# Patient Record
Sex: MALE | Race: WHITE | NOT HISPANIC OR LATINO | Employment: OTHER | URBAN - METROPOLITAN AREA
[De-identification: names, ages, dates, MRNs, and addresses within clinical notes are randomized per-mention and may not be internally consistent; named-entity substitution may affect disease eponyms.]

---

## 2017-09-14 DIAGNOSIS — G40.209 PARTIAL SYMPTOMATIC EPILEPSY WITH COMPLEX PARTIAL SEIZURES, NOT INTRACTABLE, WITHOUT STATUS EPILEPTICUS (HCC): ICD-10-CM

## 2017-09-14 RX ORDER — LEVETIRACETAM 500 MG/1
TABLET ORAL
Qty: 60 TABLET | Refills: 0 | Status: SHIPPED | OUTPATIENT
Start: 2017-09-14 | End: 2017-09-27 | Stop reason: SDUPTHER

## 2017-09-27 ENCOUNTER — OFFICE VISIT (OUTPATIENT)
Dept: NEUROLOGY | Facility: CLINIC | Age: 22
End: 2017-09-27

## 2017-09-27 ENCOUNTER — APPOINTMENT (OUTPATIENT)
Dept: LAB | Facility: HOSPITAL | Age: 22
End: 2017-09-27

## 2017-09-27 VITALS
DIASTOLIC BLOOD PRESSURE: 73 MMHG | HEIGHT: 65 IN | RESPIRATION RATE: 16 BRPM | SYSTOLIC BLOOD PRESSURE: 114 MMHG | HEART RATE: 65 BPM | BODY MASS INDEX: 24.32 KG/M2 | WEIGHT: 146 LBS

## 2017-09-27 DIAGNOSIS — G40.209 PARTIAL SYMPTOMATIC EPILEPSY WITH COMPLEX PARTIAL SEIZURES, NOT INTRACTABLE, WITHOUT STATUS EPILEPTICUS (HCC): Primary | ICD-10-CM

## 2017-09-27 LAB
ALBUMIN SERPL-MCNC: 5.1 G/DL (ref 3.2–4.8)
ALBUMIN/GLOB SERPL: 1.8 G/DL (ref 1.5–2.5)
ALP SERPL-CCNC: 133 U/L (ref 25–100)
ALT SERPL W P-5'-P-CCNC: 31 U/L (ref 7–40)
ANION GAP SERPL CALCULATED.3IONS-SCNC: 7 MMOL/L (ref 3–11)
AST SERPL-CCNC: 26 U/L (ref 0–33)
BASOPHILS # BLD AUTO: 0.06 10*3/MM3 (ref 0–0.2)
BASOPHILS NFR BLD AUTO: 1.1 % (ref 0–1)
BILIRUB SERPL-MCNC: 0.6 MG/DL (ref 0.3–1.2)
BUN BLD-MCNC: 15 MG/DL (ref 9–23)
BUN/CREAT SERPL: 16.7 (ref 7–25)
CALCIUM SPEC-SCNC: 10.1 MG/DL (ref 8.7–10.4)
CHLORIDE SERPL-SCNC: 103 MMOL/L (ref 99–109)
CO2 SERPL-SCNC: 31 MMOL/L (ref 20–31)
CREAT BLD-MCNC: 0.9 MG/DL (ref 0.6–1.3)
DEPRECATED RDW RBC AUTO: 40.3 FL (ref 37–54)
EOSINOPHIL # BLD AUTO: 0.26 10*3/MM3 (ref 0–0.3)
EOSINOPHIL NFR BLD AUTO: 4.7 % (ref 0–3)
ERYTHROCYTE [DISTWIDTH] IN BLOOD BY AUTOMATED COUNT: 11.9 % (ref 11.3–14.5)
GFR SERPL CREATININE-BSD FRML MDRD: 107 ML/MIN/1.73
GLOBULIN UR ELPH-MCNC: 2.8 GM/DL
GLUCOSE BLD-MCNC: 83 MG/DL (ref 70–100)
HCT VFR BLD AUTO: 47.2 % (ref 38.9–50.9)
HGB BLD-MCNC: 16.2 G/DL (ref 13.1–17.5)
IMM GRANULOCYTES # BLD: 0.01 10*3/MM3 (ref 0–0.03)
IMM GRANULOCYTES NFR BLD: 0.2 % (ref 0–0.6)
LYMPHOCYTES # BLD AUTO: 1.78 10*3/MM3 (ref 0.6–4.8)
LYMPHOCYTES NFR BLD AUTO: 32.5 % (ref 24–44)
MCH RBC QN AUTO: 31.4 PG (ref 27–31)
MCHC RBC AUTO-ENTMCNC: 34.3 G/DL (ref 32–36)
MCV RBC AUTO: 91.5 FL (ref 80–99)
MONOCYTES # BLD AUTO: 0.51 10*3/MM3 (ref 0–1)
MONOCYTES NFR BLD AUTO: 9.3 % (ref 0–12)
NEUTROPHILS # BLD AUTO: 2.86 10*3/MM3 (ref 1.5–8.3)
NEUTROPHILS NFR BLD AUTO: 52.2 % (ref 41–71)
PLATELET # BLD AUTO: 278 10*3/MM3 (ref 150–450)
PMV BLD AUTO: 10.8 FL (ref 6–12)
POTASSIUM BLD-SCNC: 4.1 MMOL/L (ref 3.5–5.5)
PROT SERPL-MCNC: 7.9 G/DL (ref 5.7–8.2)
RBC # BLD AUTO: 5.16 10*6/MM3 (ref 4.2–5.76)
SODIUM BLD-SCNC: 141 MMOL/L (ref 132–146)
WBC NRBC COR # BLD: 5.48 10*3/MM3 (ref 4.5–13.5)

## 2017-09-27 PROCEDURE — 36415 COLL VENOUS BLD VENIPUNCTURE: CPT | Performed by: NURSE PRACTITIONER

## 2017-09-27 PROCEDURE — 80053 COMPREHEN METABOLIC PANEL: CPT | Performed by: NURSE PRACTITIONER

## 2017-09-27 PROCEDURE — 99212 OFFICE O/P EST SF 10 MIN: CPT | Performed by: NURSE PRACTITIONER

## 2017-09-27 PROCEDURE — 85025 COMPLETE CBC W/AUTO DIFF WBC: CPT | Performed by: NURSE PRACTITIONER

## 2017-09-27 PROCEDURE — 80177 DRUG SCRN QUAN LEVETIRACETAM: CPT | Performed by: NURSE PRACTITIONER

## 2017-09-27 RX ORDER — LEVETIRACETAM 500 MG/1
500 TABLET ORAL 2 TIMES DAILY
Qty: 180 TABLET | Refills: 3 | Status: SHIPPED | OUTPATIENT
Start: 2017-09-27 | End: 2018-09-26 | Stop reason: SDUPTHER

## 2017-09-27 NOTE — PROGRESS NOTES
Subjective:     Patient ID: Luiz Sofia is a 22 y.o. male.    HPI:   History of Present Illness     Here for 1 year follow up on complex partial seizure history with onset at age 4 years of age. History of premature birth with extended NICU stay and Cerebral Palsy with continued LLE weakness. He is accompanied by his mother today. He is currently on Keppra 500mg BID and has tolerated this very well without any side effects. He is needing refills. He has been seizure free for about 9 years. They have decided to keep him on AED due to risk of having seizures would be higher than benefit of weaning off. His seizures in the past have been precipitated with aura, followed by left eye gaze, Ramos's paralysis, urinary incontinence, left sided jerking and last 3-5 minutes per his mother. He is doing well and is able to play video games and use the computer without difficulty. He has had multiple MRIs and CTs of head in past and EEGs showing abnormal spikes consistent with focal seizures. Due for annual labs today. No new concerns or complaints.    The following portions of the patient's history were reviewed and updated as appropriate: allergies, current medications, past family history, past medical history, past social history, past surgical history and problem list.    Past Medical History:   Diagnosis Date   • Cerebral palsy, monoplegia 1996   • Neurological symptoms    • Seizures        Past Surgical History:   Procedure Laterality Date   • EYE SURGERY      Retina Reattached        Social History     Social History   • Marital status: Single     Spouse name: N/A   • Number of children: N/A   • Years of education: N/A     Occupational History   • Not on file.     Social History Main Topics   • Smoking status: Never Smoker   • Smokeless tobacco: Not on file   • Alcohol use No   • Drug use: Not on file   • Sexual activity: Not on file     Other Topics Concern   • Not on file     Social History Narrative       Family  History   Problem Relation Age of Onset   • Parkinsonism Other      Grandmother   • Diabetes Father    • Hypertension Father         Review of Systems   Constitutional: Negative for chills, fatigue, fever and unexpected weight change.   HENT: Negative for ear pain, hearing loss, nosebleeds, rhinorrhea and sore throat.    Eyes: Negative for photophobia, pain, discharge, itching and visual disturbance.   Respiratory: Negative for cough, chest tightness, shortness of breath and wheezing.    Cardiovascular: Negative for chest pain, palpitations and leg swelling.   Gastrointestinal: Negative for abdominal pain, blood in stool, constipation, diarrhea, nausea and vomiting.   Genitourinary: Negative for dysuria, frequency, hematuria and urgency.   Musculoskeletal: Negative for arthralgias, back pain, gait problem, joint swelling, myalgias, neck pain and neck stiffness.   Skin: Negative for rash and wound.   Allergic/Immunologic: Negative for environmental allergies and food allergies.   Neurological: Negative for dizziness, tremors, seizures, syncope, speech difficulty, weakness, light-headedness, numbness and headaches.   Hematological: Negative for adenopathy. Does not bruise/bleed easily.   Psychiatric/Behavioral: Negative for agitation, confusion, decreased concentration, hallucinations, sleep disturbance and suicidal ideas. The patient is not nervous/anxious.         Objective:    Neurologic Exam  Mental Status   Oriented to person, place, and time.   Attention: normal. Concentration: normal.   Speech: speech is normal   Level of consciousness: alert     Cranial Nerves   Cranial nerves II through XII intact.      Motor Exam   Muscle bulk: normal  Right arm tone: normal  Left arm tone: normal  Right leg tone: normal  Left leg tone: spastic     Strength   Strength 5/5 except as noted.   Left quadriceps: 4/5  Left hamstrin/5  Left anterior tibial: 4/5  Left posterior tibial: 4/5  Left peroneal: 4/5  Left gastroc:  4/5     Sensory Exam   Light touch normal.   Vibration normal.   Proprioception normal.   Pinprick normal.      Gait, Coordination, and Reflexes      Gait  Gait: (mild limp with left lower extremity CP)     Coordination   Romberg: negative  Finger to nose coordination: normal  Heel to shin coordination: normal     Tremor   Resting tremor: absent  Intention tremor: absent  Action tremor: absent     Reflexes   Right brachioradialis: 2+  Left brachioradialis: 2+  Right biceps: 2+  Left biceps: 2+  Right triceps: 2+  Left triceps: 2+  Right patellar: 2+  Left patellar: 3+  Right achilles: 2+  Left achilles: 3+  Right : 2+  Left : 2+  Right plantar: normal  Left plantar: normal  Right Kim: absent  Left Kim: absent  Right ankle clonus: absent  Left ankle clonus: absent     Physical Exam  Constitutional: He is oriented to person, place, and time.   Neurological: He is oriented to person, place, and time. He has a normal Finger-Nose-Finger Test, a normal Heel to Camp Test and a normal Romberg Test.   Reflex Scores:       Tricep reflexes are 2+ on the right side and 2+ on the left side.       Bicep reflexes are 2+ on the right side and 2+ on the left side.       Brachioradialis reflexes are 2+ on the right side and 2+ on the left side.       Patellar reflexes are 2+ on the right side and 3+ on the left side.       Achilles reflexes are 2+ on the right side and 3+ on the left side.  Psychiatric: His speech is normal.     Assessment/Plan:       Luiz was seen today for seizures.    Diagnoses and all orders for this visit:    Partial symptomatic epilepsy with complex partial seizures, not intractable, without status epilepticus  -     levETIRAcetam (KEPPRA) 500 MG tablet; Take 1 tablet by mouth 2 (Two) Times a Day.  -     CBC & Differential  -     Comprehensive Metabolic Panel  -     Levetiracetam Level (Keppra)  -     CBC Auto Differential         Continue current medications. Labs due today. F/U in 1 year or  sooner if needed. Reviewed medications, potential side effects and signs and symptoms to report. Discussed risk versus benefits of treatment plan with patient and/or family-including medications, labs and radiology that may be ordered. Addressed questions and concerns during visit. Patient and/or family verbalized understanding and agree with plan.    During this visit the following were done:  Labs Reviewed []    Labs Ordered [x]    Radiology Reports Reviewed []    Radiology Ordered []    PCP Records Reviewed []    Referring Provider Records Reviewed []    ER Records Reviewed []    Hospital Records Reviewed []    History Obtained From Family []    Radiology Images Reviewed []    Other Reviewed []    Records Requested []      Edna García, APRN  9/27/2017

## 2017-09-28 ENCOUNTER — TELEPHONE (OUTPATIENT)
Dept: NEUROLOGY | Facility: CLINIC | Age: 22
End: 2017-09-28

## 2017-09-29 ENCOUNTER — TELEPHONE (OUTPATIENT)
Dept: NEUROLOGY | Facility: CLINIC | Age: 22
End: 2017-09-29

## 2017-09-29 NOTE — TELEPHONE ENCOUNTER
I spoke with the pt's mother and let her know that the pt's lab results were normal and a copy had been faxed to his PCP and she acknowledged understanding

## 2017-09-30 LAB — LEVETIRACETAM SERPL-MCNC: 20.9 UG/ML (ref 10–40)

## 2018-09-26 ENCOUNTER — APPOINTMENT (OUTPATIENT)
Dept: LAB | Facility: HOSPITAL | Age: 23
End: 2018-09-26

## 2018-09-26 ENCOUNTER — OFFICE VISIT (OUTPATIENT)
Dept: NEUROLOGY | Facility: CLINIC | Age: 23
End: 2018-09-26

## 2018-09-26 VITALS
WEIGHT: 150 LBS | BODY MASS INDEX: 24.96 KG/M2 | DIASTOLIC BLOOD PRESSURE: 60 MMHG | OXYGEN SATURATION: 99 % | HEART RATE: 54 BPM | SYSTOLIC BLOOD PRESSURE: 122 MMHG

## 2018-09-26 DIAGNOSIS — G40.209 PARTIAL SYMPTOMATIC EPILEPSY WITH COMPLEX PARTIAL SEIZURES, NOT INTRACTABLE, WITHOUT STATUS EPILEPTICUS (HCC): Primary | ICD-10-CM

## 2018-09-26 DIAGNOSIS — G80.8 CEREBRAL PALSY, MONOPLEGIA (HCC): ICD-10-CM

## 2018-09-26 LAB
ALBUMIN SERPL-MCNC: 4.75 G/DL (ref 3.2–4.8)
ALBUMIN/GLOB SERPL: 1.7 G/DL (ref 1.5–2.5)
ALP SERPL-CCNC: 131 U/L (ref 25–100)
ALT SERPL W P-5'-P-CCNC: 37 U/L (ref 7–40)
ANION GAP SERPL CALCULATED.3IONS-SCNC: 7 MMOL/L (ref 3–11)
AST SERPL-CCNC: 25 U/L (ref 0–33)
BASOPHILS # BLD AUTO: 0.06 10*3/MM3 (ref 0–0.2)
BASOPHILS NFR BLD AUTO: 0.9 % (ref 0–1)
BILIRUB SERPL-MCNC: 0.6 MG/DL (ref 0.3–1.2)
BUN BLD-MCNC: 16 MG/DL (ref 9–23)
BUN/CREAT SERPL: 17.4 (ref 7–25)
CALCIUM SPEC-SCNC: 9.5 MG/DL (ref 8.7–10.4)
CHLORIDE SERPL-SCNC: 102 MMOL/L (ref 99–109)
CO2 SERPL-SCNC: 31 MMOL/L (ref 20–31)
CREAT BLD-MCNC: 0.92 MG/DL (ref 0.6–1.3)
DEPRECATED RDW RBC AUTO: 40.7 FL (ref 37–54)
EOSINOPHIL # BLD AUTO: 0.44 10*3/MM3 (ref 0–0.3)
EOSINOPHIL NFR BLD AUTO: 6.3 % (ref 0–3)
ERYTHROCYTE [DISTWIDTH] IN BLOOD BY AUTOMATED COUNT: 12.1 % (ref 11.3–14.5)
GFR SERPL CREATININE-BSD FRML MDRD: 103 ML/MIN/1.73
GLOBULIN UR ELPH-MCNC: 2.9 GM/DL
GLUCOSE BLD-MCNC: 108 MG/DL (ref 70–100)
HCT VFR BLD AUTO: 48.4 % (ref 38.9–50.9)
HGB BLD-MCNC: 16.4 G/DL (ref 13.1–17.5)
IMM GRANULOCYTES # BLD: 0.02 10*3/MM3 (ref 0–0.03)
IMM GRANULOCYTES NFR BLD: 0.3 % (ref 0–0.6)
LYMPHOCYTES # BLD AUTO: 2 10*3/MM3 (ref 0.6–4.8)
LYMPHOCYTES NFR BLD AUTO: 28.8 % (ref 24–44)
MCH RBC QN AUTO: 31 PG (ref 27–31)
MCHC RBC AUTO-ENTMCNC: 33.9 G/DL (ref 32–36)
MCV RBC AUTO: 91.5 FL (ref 80–99)
MONOCYTES # BLD AUTO: 0.55 10*3/MM3 (ref 0–1)
MONOCYTES NFR BLD AUTO: 7.9 % (ref 0–12)
NEUTROPHILS # BLD AUTO: 3.88 10*3/MM3 (ref 1.5–8.3)
NEUTROPHILS NFR BLD AUTO: 55.8 % (ref 41–71)
PLATELET # BLD AUTO: 251 10*3/MM3 (ref 150–450)
PMV BLD AUTO: 10.7 FL (ref 6–12)
POTASSIUM BLD-SCNC: 3.9 MMOL/L (ref 3.5–5.5)
PROT SERPL-MCNC: 7.6 G/DL (ref 5.7–8.2)
RBC # BLD AUTO: 5.29 10*6/MM3 (ref 4.2–5.76)
SODIUM BLD-SCNC: 140 MMOL/L (ref 132–146)
WBC NRBC COR # BLD: 6.95 10*3/MM3 (ref 3.5–10.8)

## 2018-09-26 PROCEDURE — 99213 OFFICE O/P EST LOW 20 MIN: CPT | Performed by: NURSE PRACTITIONER

## 2018-09-26 PROCEDURE — 80053 COMPREHEN METABOLIC PANEL: CPT | Performed by: NURSE PRACTITIONER

## 2018-09-26 PROCEDURE — 85025 COMPLETE CBC W/AUTO DIFF WBC: CPT | Performed by: NURSE PRACTITIONER

## 2018-09-26 PROCEDURE — 36415 COLL VENOUS BLD VENIPUNCTURE: CPT | Performed by: NURSE PRACTITIONER

## 2018-09-26 PROCEDURE — 80177 DRUG SCRN QUAN LEVETIRACETAM: CPT | Performed by: NURSE PRACTITIONER

## 2018-09-26 RX ORDER — LEVETIRACETAM 500 MG/1
500 TABLET ORAL 2 TIMES DAILY
Qty: 180 TABLET | Refills: 3 | Status: SHIPPED | OUTPATIENT
Start: 2018-09-26 | End: 2019-09-18 | Stop reason: SDUPTHER

## 2018-09-26 NOTE — PROGRESS NOTES
Subjective:     Patient ID: Luiz Sofia is a 23 y.o. male.    CC:   Chief Complaint   Patient presents with   • Seizures       HPI:   History of Present Illness   This is a pleasant 24 yo male who presents for 1 year follow up on complex partial seizure history with onset at age 4 years of age. History of premature birth with extended NICU stay and Cerebral Palsy with continued LUE/LLE weakness. He is accompanied by his mother today. He is currently on Keppra 500mg BID and has tolerated this very well without any side effects. He is needing refills today. He has been seizure free for about 10 years. They wish to continue current medications. His seizures in the past have been precipitated with aura, followed by left eye gaze, Ramos's paralysis, urinary incontinence, left sided jerking and last 3-5 minutes per his mother. He is doing well and is able to play video games and use the computer without difficulty. He is rowing 5-6 days a week at home with a rowing machine and is playing special olympics basketball and softball and this has improved his core over the past year. He has had multiple MRIs and CTs of head in past and EEGs showing abnormal spikes consistent with focal seizures. No new concerns or complaints.     The following portions of the patient's history were reviewed and updated as appropriate: allergies, current medications, past family history, past medical history, past social history, past surgical history and problem list.    Past Medical History:   Diagnosis Date   • Cerebral palsy, monoplegia (CMS/HCC) 1996   • Neurological symptoms    • Seizures (CMS/HCC)        Past Surgical History:   Procedure Laterality Date   • EYE SURGERY      Retina Reattached        Social History     Social History   • Marital status: Single     Spouse name: N/A   • Number of children: N/A   • Years of education: N/A     Occupational History   • Not on file.     Social History Main Topics   • Smoking status: Never Smoker    • Smokeless tobacco: Not on file   • Alcohol use No   • Drug use: Unknown   • Sexual activity: Defer     Other Topics Concern   • Not on file     Social History Narrative   • No narrative on file       Family History   Problem Relation Age of Onset   • Parkinsonism Other         Grandmother   • Diabetes Father    • Hypertension Father         Review of Systems   Constitutional: Negative for chills, fatigue, fever and unexpected weight change.   HENT: Negative for ear pain, hearing loss, nosebleeds, rhinorrhea and sore throat.    Eyes: Negative for photophobia, pain, discharge, itching and visual disturbance.   Respiratory: Negative for cough, chest tightness, shortness of breath and wheezing.    Cardiovascular: Negative for chest pain, palpitations and leg swelling.   Gastrointestinal: Negative for abdominal pain, blood in stool, constipation, diarrhea, nausea and vomiting.   Genitourinary: Negative for dysuria, frequency, hematuria and urgency.   Musculoskeletal: Negative for arthralgias, back pain, gait problem, joint swelling, myalgias, neck pain and neck stiffness.   Skin: Negative for rash and wound.   Allergic/Immunologic: Negative for environmental allergies and food allergies.   Neurological: Negative for dizziness, tremors, seizures, syncope, speech difficulty, weakness, light-headedness, numbness and headaches.   Hematological: Negative for adenopathy. Does not bruise/bleed easily.   Psychiatric/Behavioral: Negative for agitation, confusion, decreased concentration, hallucinations, sleep disturbance and suicidal ideas. The patient is not nervous/anxious.    All other systems reviewed and are negative.       Objective:    Neurologic Exam     Mental Status   Oriented to person, place, and time.   Level of consciousness: alert    Cranial Nerves   Cranial nerves II through XII intact.     Motor Exam   Muscle bulk: normal  Right arm tone: normal  Left arm tone: increased  Right leg tone: normal  Left leg  tone: spastic    Strength   Strength 5/5 except as noted.   Left strength: LUE gross motor intact fully, LLE 4/5    Gait, Coordination, and Reflexes     Gait  Gait: (mild limp with left lower extremity CP)    Reflexes   Right brachioradialis: 2+  Left brachioradialis: 2+  Right biceps: 2+  Left biceps: 2+  Right triceps: 2+  Left triceps: 2+  Right patellar: 2+  Left patellar: 3+  Right achilles: 2+  Left achilles: 2+  Right : 2+  Left : 2+      Physical Exam   Constitutional: He is oriented to person, place, and time.   Neurological: He is oriented to person, place, and time.   Reflex Scores:       Tricep reflexes are 2+ on the right side and 2+ on the left side.       Bicep reflexes are 2+ on the right side and 2+ on the left side.       Brachioradialis reflexes are 2+ on the right side and 2+ on the left side.       Patellar reflexes are 2+ on the right side and 3+ on the left side.       Achilles reflexes are 2+ on the right side and 2+ on the left side.      Assessment/Plan:       Luiz was seen today for seizures.    Diagnoses and all orders for this visit:    Partial symptomatic epilepsy with complex partial seizures, not intractable, without status epilepticus (CMS/HCC)  -     levETIRAcetam (KEPPRA) 500 MG tablet; Take 1 tablet by mouth 2 (Two) Times a Day.  -     Comprehensive Metabolic Panel  -     CBC & Differential  -     Levetiracetam Level (Keppra)    Cerebral palsy, monoplegia (CMS/HCC)  Comments:  Continue rowing 5-6 days a week; continue Basketball and Softball with [employer].         F/U in 1 year or sooner if needed. Reviewed medications, potential side effects and signs and symptoms to report. Discussed risk versus benefits of treatment plan with patient and/or family-including medications, labs and radiology that may be ordered. Addressed questions and concerns during visit. Patient and/or family verbalized understanding and agree with plan.    During this visit the following were  done:  Labs Reviewed [x]   9/27/17 Keppra, CBC with diff and CMP all were stable with no significant abnormalities.  Labs Ordered [x]    Radiology Reports Reviewed []    Radiology Ordered []    PCP Records Reviewed []    Referring Provider Records Reviewed []    ER Records Reviewed []    Hospital Records Reviewed []    History Obtained From Family []    Radiology Images Reviewed []    Other Reviewed []    Records Requested []      Patient instructions include: No driving or operating heavy machinery for 3 months from onset of most recent seizure. Minimize stress as much as possible. Recommended 7-8 hours of sleep each night. Abstain from alcohol intake. Educated on Antiepileptic medications with possible side effects and signs and symptoms to report if prescribed during visit. Instructed to take seizure medication daily if prescribed. Reviewed potential seizure risk factors. Instructed to call 911 or our office if another seizure does occur.    EMR Dragon/Transcription Disclaimer:  Much of this encounter note is an electronic transcription of spoken language to printed text. Electronic transcription of spoken language may permit erroneous words or phrases to be inadvertently transcribed. Although I have reviewed the note for such errors, some may still exist in this documentation.      Edna García, APRN  9/26/2018

## 2018-09-27 ENCOUNTER — TELEPHONE (OUTPATIENT)
Dept: NEUROLOGY | Facility: CLINIC | Age: 23
End: 2018-09-27

## 2018-09-28 ENCOUNTER — TELEPHONE (OUTPATIENT)
Dept: NEUROLOGY | Facility: CLINIC | Age: 23
End: 2018-09-28

## 2018-09-28 NOTE — TELEPHONE ENCOUNTER
----- Message from KAT Lama sent at 9/27/2018  8:22 AM EDT -----  Labs look good overall. Please mail copy to patient/mom and fax copy to PCP. Thanks, KAT Hairston

## 2018-09-28 NOTE — TELEPHONE ENCOUNTER
Spoke with pt's mother and gave normal lab results and faxed results to PCP and mailed pt a copy as well.

## 2018-09-29 LAB — LEVETIRACETAM SERPL-MCNC: 24.3 UG/ML (ref 10–40)

## 2018-10-01 ENCOUNTER — TELEPHONE (OUTPATIENT)
Dept: NEUROLOGY | Facility: CLINIC | Age: 23
End: 2018-10-01

## 2018-10-01 NOTE — TELEPHONE ENCOUNTER
----- Message from KAT Lama sent at 10/1/2018  8:25 AM EDT -----  Labs WNL. Fax to PCP. Mail copy to patient. Continue current medication/dosage. F/U as scheduled. Thanks, KAT Hairston

## 2019-04-23 DIAGNOSIS — G40.209 PARTIAL SYMPTOMATIC EPILEPSY WITH COMPLEX PARTIAL SEIZURES, NOT INTRACTABLE, WITHOUT STATUS EPILEPTICUS (HCC): ICD-10-CM

## 2019-04-24 RX ORDER — LEVETIRACETAM 500 MG/1
TABLET ORAL
Qty: 180 TABLET | Refills: 2 | Status: SHIPPED | OUTPATIENT
Start: 2019-04-24 | End: 2019-09-18

## 2019-09-18 ENCOUNTER — OFFICE VISIT (OUTPATIENT)
Dept: NEUROLOGY | Facility: CLINIC | Age: 24
End: 2019-09-18

## 2019-09-18 VITALS
OXYGEN SATURATION: 99 % | WEIGHT: 157 LBS | HEIGHT: 65 IN | SYSTOLIC BLOOD PRESSURE: 124 MMHG | BODY MASS INDEX: 26.16 KG/M2 | DIASTOLIC BLOOD PRESSURE: 80 MMHG | HEART RATE: 75 BPM

## 2019-09-18 DIAGNOSIS — G40.209 PARTIAL SYMPTOMATIC EPILEPSY WITH COMPLEX PARTIAL SEIZURES, NOT INTRACTABLE, WITHOUT STATUS EPILEPTICUS (HCC): ICD-10-CM

## 2019-09-18 DIAGNOSIS — G80.8 CEREBRAL PALSY, MONOPLEGIA (HCC): Primary | ICD-10-CM

## 2019-09-18 PROCEDURE — 99213 OFFICE O/P EST LOW 20 MIN: CPT | Performed by: NURSE PRACTITIONER

## 2019-09-18 RX ORDER — LEVETIRACETAM 500 MG/1
500 TABLET ORAL 2 TIMES DAILY
Qty: 180 TABLET | Refills: 3 | Status: SHIPPED | OUTPATIENT
Start: 2019-09-18 | End: 2020-10-07

## 2019-09-18 NOTE — PROGRESS NOTES
Subjective:     Patient ID: Luiz Sofia is a 24 y.o. male.    CC:   Chief Complaint   Patient presents with   • Seizures       HPI:   History of Present Illness   This is a pleasant 23 yo male who presents for 1 year follow up on complex partial seizure history with onset at age 4 years of age. History of premature birth with extended NICU stay and Cerebral Palsy with continued LUE/LLE weakness. He is accompanied by his mother today. He is currently on Keppra 500mg BID and has tolerated this very well without any side effects. He has been seizure free for about 11 years. They wish to continue current medications. His seizures in the past have been precipitated with aura, followed by left eye gaze, Ramos's paralysis, urinary incontinence, left sided jerking and last 3-5 minutes per his mother. He is doing well and is able to play video games and use the computer without difficulty. He is rowing 5-6 days a week at home with a rowing machine and is playing special olympics basketball and softball and has a very supportive family. He has had multiple MRIs and CTs of head in past and EEGs showing abnormal spikes consistent with focal seizures. No new concerns or complaints. Due for annual physical with PCP soon d/t [facility] requirements-would like to get all labs collected at the same time.     The following portions of the patient's history were reviewed and updated as appropriate: allergies, current medications, past family history, past medical history, past social history, past surgical history and problem list.    Past Medical History:   Diagnosis Date   • Cerebral palsy, monoplegia (CMS/HCC) 1996   • Neurological symptoms    • Seizures (CMS/HCC)        Past Surgical History:   Procedure Laterality Date   • EYE SURGERY      Retina Reattached        Social History     Socioeconomic History   • Marital status: Single     Spouse name: Not on file   • Number of children: Not on file   • Years of education: Not  on file   • Highest education level: Not on file   Tobacco Use   • Smoking status: Never Smoker   Substance and Sexual Activity   • Alcohol use: No   • Sexual activity: Defer       Family History   Problem Relation Age of Onset   • Parkinsonism Other         Grandmother   • Diabetes Father    • Hypertension Father         Review of Systems   Constitutional: Negative for chills, fatigue, fever and unexpected weight change.   HENT: Negative for ear pain, hearing loss, nosebleeds, rhinorrhea and sore throat.    Eyes: Negative for photophobia, pain, discharge, itching and visual disturbance.   Respiratory: Negative for cough, chest tightness, shortness of breath and wheezing.    Cardiovascular: Negative for chest pain, palpitations and leg swelling.   Gastrointestinal: Negative for abdominal pain, blood in stool, constipation, diarrhea, nausea and vomiting.   Genitourinary: Negative for dysuria, frequency, hematuria and urgency.   Musculoskeletal: Negative for arthralgias, back pain, gait problem, joint swelling, myalgias, neck pain and neck stiffness.   Skin: Negative for rash and wound.   Allergic/Immunologic: Negative for environmental allergies and food allergies.   Neurological: Positive for seizures. Negative for dizziness, tremors, syncope, speech difficulty, weakness, light-headedness, numbness and headaches.   Hematological: Negative for adenopathy. Does not bruise/bleed easily.   Psychiatric/Behavioral: Negative for agitation, confusion, decreased concentration, hallucinations, sleep disturbance and suicidal ideas. The patient is not nervous/anxious.    All other systems reviewed and are negative.       Objective:    Neurologic Exam  Mental Status   Oriented to person, place, and time.   Level of consciousness: alert     Cranial Nerves   Cranial nerves II through XII intact.      Motor Exam   Muscle bulk: normal  Right arm tone: normal  Left arm tone: increased  Right leg tone: normal  Left leg tone:  spastic     Strength   Strength 5/5 except as noted.   Left strength: LUE gross motor intact fully, LLE 4/5     Gait, Coordination, and Reflexes      Gait  Gait: (mild limp with left lower extremity CP)     Reflexes   Right brachioradialis: 2+  Left brachioradialis: 2+  Right biceps: 2+  Left biceps: 2+  Right triceps: 2+  Left triceps: 2+  Right patellar: 2+  Left patellar: 3+  Right achilles: 2+  Left achilles: 2+  Right : 2+  Left : 2+    Physical Exam  Constitutional: He is oriented to person, place, and time.   Neurological: He is oriented to person, place, and time.   Reflex Scores:       Tricep reflexes are 2+ on the right side and 2+ on the left side.       Bicep reflexes are 2+ on the right side and 2+ on the left side.       Brachioradialis reflexes are 2+ on the right side and 2+ on the left side.       Patellar reflexes are 2+ on the right side and 3+ on the left side.       Achilles reflexes are 2+ on the right side and 2+ on the left side.    Assessment/Plan:       Luiz was seen today for seizures.    Diagnoses and all orders for this visit:    Cerebral palsy, monoplegia (CMS/HCC)  Comments:  continue with rowing and special [employer]    Partial symptomatic epilepsy with complex partial seizures, not intractable, without status epilepticus (CMS/HCC)  -     levETIRAcetam (KEPPRA) 500 MG tablet; Take 1 tablet by mouth 2 (Two) Times a Day.  -     Comprehensive Metabolic Panel; Future  -     CBC & Differential; Future  -     Levetiracetam Level (Keppra); Future         Labs to be collected with next PCP appointment and faxed to our office. Continue current meds and being physically active. F/U in 1 year or sooner if needed. Reviewed medications, potential side effects and signs and symptoms to report. Discussed risk versus benefits of treatment plan with patient and/or family-including medications, labs and radiology that may be ordered. Addressed questions and concerns during visit. Patient and/or  family verbalized understanding and agree with plan.    Patient instructions include: No driving or operating heavy machinery for 3 months from onset of most recent seizure. Minimize stress as much as possible. Recommended 7-8 hours of sleep each night. Abstain from alcohol intake. Educated on Antiepileptic medications with possible side effects and signs and symptoms to report if prescribed during visit. Instructed to take seizure medication daily if prescribed. Reviewed potential seizure risk factors. Instructed to call 911 or our office if another seizure does occur.    During this visit the following were done:  Labs Reviewed [x]   blood work from last visit 9/26/2018 included levetiracetam level of 24.3, CMP with alkaline phos 131 which has been chronically elevated as well as glucose of 108 and otherwise normal.  CBC with differential within normal limits.  Labs Ordered [x]    Radiology Reports Reviewed []    Radiology Ordered []    PCP Records Reviewed []    Referring Provider Records Reviewed []    ER Records Reviewed []    Hospital Records Reviewed []    History Obtained From Family []    Radiology Images Reviewed []    Other Reviewed []    Records Requested []      EMR Dragon/Transcription Disclaimer:  Much of this encounter note is an electronic transcription of spoken language to printed text. Electronic transcription of spoken language may permit erroneous words or phrases to be inadvertently transcribed. Although I have reviewed the note for such errors, some may still exist in this documentation.    Edna García, APRN  9/18/2019

## 2020-10-07 ENCOUNTER — TELEPHONE (OUTPATIENT)
Dept: NEUROLOGY | Facility: CLINIC | Age: 25
End: 2020-10-07

## 2020-10-07 DIAGNOSIS — G40.209 PARTIAL SYMPTOMATIC EPILEPSY WITH COMPLEX PARTIAL SEIZURES, NOT INTRACTABLE, WITHOUT STATUS EPILEPTICUS (HCC): ICD-10-CM

## 2020-10-07 RX ORDER — LEVETIRACETAM 500 MG/1
TABLET ORAL
Qty: 180 TABLET | Refills: 0 | Status: SHIPPED | OUTPATIENT
Start: 2020-10-07 | End: 2020-12-17 | Stop reason: SDUPTHER

## 2020-10-07 NOTE — TELEPHONE ENCOUNTER
LVM with pt letting him know he will need to call and scheduled a f/u within the next 60 days for further refills on medication.

## 2020-10-07 NOTE — TELEPHONE ENCOUNTER
Patient needs appointment within the next 60 days for additional refills. Please call to schedule follow up. He no showed for last one. KAT Lewis

## 2020-12-17 ENCOUNTER — LAB (OUTPATIENT)
Dept: LAB | Facility: HOSPITAL | Age: 25
End: 2020-12-17

## 2020-12-17 ENCOUNTER — OFFICE VISIT (OUTPATIENT)
Dept: NEUROLOGY | Facility: CLINIC | Age: 25
End: 2020-12-17

## 2020-12-17 VITALS
OXYGEN SATURATION: 99 % | TEMPERATURE: 98.2 F | HEIGHT: 67 IN | DIASTOLIC BLOOD PRESSURE: 76 MMHG | BODY MASS INDEX: 24.48 KG/M2 | WEIGHT: 156 LBS | HEART RATE: 71 BPM | SYSTOLIC BLOOD PRESSURE: 118 MMHG

## 2020-12-17 DIAGNOSIS — G80.8 CEREBRAL PALSY, MONOPLEGIA (HCC): ICD-10-CM

## 2020-12-17 DIAGNOSIS — Z51.81 ENCOUNTER FOR THERAPEUTIC DRUG LEVEL MONITORING: ICD-10-CM

## 2020-12-17 DIAGNOSIS — G40.209 PARTIAL SYMPTOMATIC EPILEPSY WITH COMPLEX PARTIAL SEIZURES, NOT INTRACTABLE, WITHOUT STATUS EPILEPTICUS (HCC): Primary | ICD-10-CM

## 2020-12-17 DIAGNOSIS — G40.209 PARTIAL SYMPTOMATIC EPILEPSY WITH COMPLEX PARTIAL SEIZURES, NOT INTRACTABLE, WITHOUT STATUS EPILEPTICUS (HCC): ICD-10-CM

## 2020-12-17 PROCEDURE — 36415 COLL VENOUS BLD VENIPUNCTURE: CPT

## 2020-12-17 PROCEDURE — 80177 DRUG SCRN QUAN LEVETIRACETAM: CPT

## 2020-12-17 PROCEDURE — 99213 OFFICE O/P EST LOW 20 MIN: CPT | Performed by: NURSE PRACTITIONER

## 2020-12-17 PROCEDURE — 80053 COMPREHEN METABOLIC PANEL: CPT

## 2020-12-17 PROCEDURE — 85025 COMPLETE CBC W/AUTO DIFF WBC: CPT

## 2020-12-17 RX ORDER — LEVETIRACETAM 500 MG/1
500 TABLET ORAL 2 TIMES DAILY
Qty: 180 TABLET | Refills: 3 | Status: SHIPPED | OUTPATIENT
Start: 2020-12-17 | End: 2022-02-10

## 2020-12-17 NOTE — PROGRESS NOTES
Subjective:     Patient ID: Luiz Sofia is a 25 y.o. male.    CC:   Chief Complaint   Patient presents with   • Seizures       HPI:   History of Present Illness   This is a pleasant 24 yo male who presents for 15 month follow up on complex partial seizure history with onset at age 4 years of age. He is accompanied by his mom during today's visit. History of premature birth with extended NICU stay and Cerebral Palsy with continued LUE/LLE weakness, but he tells me he is not limited in his activity much. He is currently on Keppra 500mg BID and has tolerated this very well without any side effects. He has been seizure free for about 12 years. They wish to continue current medications with no changes. His seizures in the past have been precipitated with aura, followed by left eye gaze, Ramos's paralysis, urinary incontinence, left sided jerking and last 3-5 minutes per his mother. He is doing well and is able to play video games and use the computer without difficulty. He is rowing 5-6 days a week at home with a rowing machine and is playing special Olympics basketball and softball and has a very supportive family. He has not been able to go to his adult day center or have recent labs d/t COVID-19 pandemic. He has had multiple MRIs and CTs of head in past and EEGs showing abnormal spikes consistent with focal seizures. Mother requests physical today due to needing to continue his [facility] services with respite care. He is also happy to get his labs today. Mom tells me at birth he had 2 heart murmurs and there was a plan for surgery, but she tells me his murmurs resolved spontaneously and he has never had heart issues since that time.    The following portions of the patient's history were reviewed and updated as appropriate: allergies, current medications, past family history, past medical history, past social history, past surgical history and problem list.    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   • Neurological symptoms    • Seizures (CMS/[facility])        Past Surgical History:   Procedure Laterality Date   • EYE SURGERY      Retina Reattached        Social History     Socioeconomic History   • Marital status: Single     Spouse name: Not on file   • Number of children: Not on file   • Years of education: Not on file   • Highest education level: Not on file   Tobacco Use   • Smoking status: Never Smoker   • Smokeless tobacco: Never Used   Substance and Sexual Activity   • Alcohol use: No   • Sexual activity: Defer       Family History   Problem Relation Age of Onset   • Parkinsonism Other         Grandmother   • Diabetes Father    • Hypertension Father         Review of Systems   Constitutional: Negative for chills, fatigue, fever and unexpected weight change.   HENT: Negative for ear pain, hearing loss, nosebleeds, rhinorrhea and sore throat.    Eyes: Negative for photophobia, pain, discharge, itching and visual disturbance.   Respiratory: Negative for cough, chest tightness, shortness of breath and wheezing.    Cardiovascular: Negative for chest pain, palpitations and leg swelling.   Gastrointestinal: Negative for abdominal pain, blood in stool, constipation, diarrhea, nausea and vomiting.   Genitourinary: Negative for dysuria, frequency, hematuria and urgency.   Musculoskeletal: Negative for arthralgias, back pain, gait problem, joint swelling, myalgias, neck pain and neck stiffness.   Skin: Negative for rash and wound.   Allergic/Immunologic: Negative for environmental allergies and food allergies.   Neurological: Positive for seizures. Negative for dizziness, tremors, syncope, speech difficulty, weakness, light-headedness, numbness and headaches.   Hematological: Negative for adenopathy. Does not bruise/bleed easily.   Psychiatric/Behavioral: Negative for agitation, confusion, decreased concentration, hallucinations, sleep disturbance and suicidal ideas. The patient  "is not nervous/anxious.    All other systems reviewed and are negative.       Objective:  /76   Pulse 71   Temp 98.2 °F (36.8 °C)   Ht 170.2 cm (67\")   Wt 70.8 kg (156 lb)   SpO2 99%   BMI 24.43 kg/m²     Neurologic Exam     Mental Status   Oriented to person, place, and time.   Speech: speech is normal   Level of consciousness: alert    Cranial Nerves   Cranial nerves II through XII intact.     CN III, IV, VI   Pupils are equal, round, and reactive to light.    Motor Exam   Muscle bulk: normal  Overall muscle tone: normal    Strength   Strength 5/5 except as noted.   Left strength: LUE 4-5/5, LLE 4-5/5, SOME MILD FOOT DROP, MONOPLEGIA CHRONIC WITH CP    Gait, Coordination, and Reflexes     Gait  Gait: wide-based (ANTALGIC MILD D/T LEFT MONOPLEGIA FROM CP)    Coordination   Finger to nose coordination: normal    Tremor   Resting tremor: absent  Intention tremor: absent  Action tremor: absent    Reflexes   Right brachioradialis: 2+  Left brachioradialis: 2+  Right biceps: 2+  Left biceps: 2+  Right triceps: 2+  Left triceps: 2+  Right patellar: 2+  Left patellar: 2+  Right achilles: 2+  Left achilles: 2+  Right : 2+  Left : 2+      Physical Exam  Constitutional:       Appearance: Normal appearance.   HENT:      Head: Normocephalic and atraumatic.      Right Ear: Hearing, tympanic membrane, ear canal and external ear normal.      Left Ear: Hearing, tympanic membrane, ear canal and external ear normal.      Nose: Nose normal.      Mouth/Throat:      Lips: Pink.      Mouth: Mucous membranes are moist.      Dentition: Normal dentition. No dental abscesses or gum lesions.      Pharynx: Oropharynx is clear. Uvula midline.   Eyes:      General: Lids are normal.      Extraocular Movements: Extraocular movements intact.      Conjunctiva/sclera: Conjunctivae normal.      Pupils: Pupils are equal, round, and reactive to light.      Visual Fields: Right eye visual fields normal and left eye visual fields " normal.   Neck:      Musculoskeletal: Full passive range of motion without pain.   Cardiovascular:      Rate and Rhythm: Normal rate and regular rhythm.      Heart sounds: Normal heart sounds, S1 normal and S2 normal.   Pulmonary:      Effort: Pulmonary effort is normal.      Breath sounds: Normal breath sounds.   Neurological:      Mental Status: He is alert and oriented to person, place, and time.      Coordination: Finger-Nose-Finger Test normal.      Deep Tendon Reflexes:      Reflex Scores:       Tricep reflexes are 2+ on the right side and 2+ on the left side.       Bicep reflexes are 2+ on the right side and 2+ on the left side.       Brachioradialis reflexes are 2+ on the right side and 2+ on the left side.       Patellar reflexes are 2+ on the right side and 2+ on the left side.       Achilles reflexes are 2+ on the right side and 2+ on the left side.  Psychiatric:         Attention and Perception: Attention and perception normal.         Mood and Affect: Affect normal. Mood is anxious.         Speech: Speech normal.         Behavior: Behavior normal.         Thought Content: Thought content normal.         Cognition and Memory: Cognition and memory normal.         Judgment: Judgment normal.         Assessment/Plan:       Diagnoses and all orders for this visit:    1. Partial symptomatic epilepsy with complex partial seizures, not intractable, without status epilepticus (CMS/HCC) (Primary)  Comments:  activity as tolerated, completed physical and paperwork for [facility]  Orders:  -     Comprehensive Metabolic Panel; Future  -     CBC & Differential; Future  -     Levetiracetam Level (Keppra); Future  -     levETIRAcetam (KEPPRA) 500 MG tablet; Take 1 tablet by mouth 2 (Two) Times a Day.  Dispense: 180 tablet; Refill: 3    2. Cerebral palsy, monoplegia (CMS/HCC)  Comments:  activity as tolerated, completed physical and paperwork for [facility]    3. Encounter for therapeutic drug level  monitoring  -     Comprehensive Metabolic Panel; Future  -     CBC & Differential; Future  -     Levetiracetam Level (Keppra); Future           Labs today. Continue current meds. Completed paperwork. F/U in 1 year or sooner if needed. Reviewed medications, potential side effects and signs and symptoms to report. Discussed risk versus benefits of treatment plan with patient and/or family-including medications, labs and radiology that may be ordered. Addressed questions and concerns during visit. Patient and/or family verbalized understanding and agree with plan.    AS THE PROVIDER, I PERSONALLY WORE PPE DURING ENTIRE FACE TO FACE ENCOUNTER IN CLINIC WITH THE PATIENT. PATIENT ALSO WORE PPE DURING ENTIRE FACE TO FACE ENCOUNTER EXCEPT FOR A MAX OF 30 SECONDS DURING NEUROLOGICAL EVALUATION OF CRANIAL NERVES AND THEN MASK WAS PLACED BACK OVER PATIENT FACE FOR REMAINDER OF VISIT. I WASHED MY HANDS BEFORE AND AFTER VISIT.    Patient instructions include: No driving or operating heavy machinery, solo bathing or tub baths for 3 months from onset of most recent seizure. Minimize stress as much as possible. Recommended 7-8 hours of sleep each night. Abstain from alcohol intake. Educated on Antiepileptic medications with possible side effects and signs and symptoms to report if prescribed during visit. Instructed to take seizure medication daily if prescribed. Reviewed potential seizure risk factors. Instructed to call 911 or our office if another seizure does occur.    During this visit the following were done:  Labs Reviewed []    Labs Ordered [x]    Radiology Reports Reviewed []    Radiology Ordered []    PCP Records Reviewed []    Referring Provider Records Reviewed []    ER Records Reviewed []    Hospital Records Reviewed []    History Obtained From Family [x]    Radiology Images Reviewed []    Other Reviewed []    Records Requested []      KAT Lama  12/17/2020

## 2020-12-18 ENCOUNTER — TELEPHONE (OUTPATIENT)
Dept: NEUROLOGY | Facility: CLINIC | Age: 25
End: 2020-12-18

## 2020-12-18 LAB
ALBUMIN SERPL-MCNC: 4.9 G/DL (ref 3.5–5.2)
ALBUMIN/GLOB SERPL: 1.4 G/DL
ALP SERPL-CCNC: 107 U/L (ref 39–117)
ALT SERPL W P-5'-P-CCNC: 66 U/L (ref 1–41)
ANION GAP SERPL CALCULATED.3IONS-SCNC: 9.2 MMOL/L (ref 5–15)
AST SERPL-CCNC: 41 U/L (ref 1–40)
BASOPHILS # BLD AUTO: 0.08 10*3/MM3 (ref 0–0.2)
BASOPHILS NFR BLD AUTO: 1.9 % (ref 0–1.5)
BILIRUB SERPL-MCNC: 0.5 MG/DL (ref 0–1.2)
BUN SERPL-MCNC: 9 MG/DL (ref 6–20)
BUN/CREAT SERPL: 9.8 (ref 7–25)
CALCIUM SPEC-SCNC: 10.6 MG/DL (ref 8.6–10.5)
CHLORIDE SERPL-SCNC: 101 MMOL/L (ref 98–107)
CO2 SERPL-SCNC: 30.8 MMOL/L (ref 22–29)
CREAT SERPL-MCNC: 0.92 MG/DL (ref 0.76–1.27)
DEPRECATED RDW RBC AUTO: 40.1 FL (ref 37–54)
EOSINOPHIL # BLD AUTO: 0.19 10*3/MM3 (ref 0–0.4)
EOSINOPHIL NFR BLD AUTO: 4.4 % (ref 0.3–6.2)
ERYTHROCYTE [DISTWIDTH] IN BLOOD BY AUTOMATED COUNT: 12.3 % (ref 12.3–15.4)
GFR SERPL CREATININE-BSD FRML MDRD: 101 ML/MIN/1.73
GLOBULIN UR ELPH-MCNC: 3.4 GM/DL
GLUCOSE SERPL-MCNC: 91 MG/DL (ref 65–99)
HCT VFR BLD AUTO: 51.6 % (ref 37.5–51)
HGB BLD-MCNC: 18.2 G/DL (ref 13–17.7)
IMM GRANULOCYTES # BLD AUTO: 0.01 10*3/MM3 (ref 0–0.05)
IMM GRANULOCYTES NFR BLD AUTO: 0.2 % (ref 0–0.5)
LYMPHOCYTES # BLD AUTO: 1.27 10*3/MM3 (ref 0.7–3.1)
LYMPHOCYTES NFR BLD AUTO: 29.5 % (ref 19.6–45.3)
MCH RBC QN AUTO: 32 PG (ref 26.6–33)
MCHC RBC AUTO-ENTMCNC: 35.3 G/DL (ref 31.5–35.7)
MCV RBC AUTO: 90.8 FL (ref 79–97)
MONOCYTES # BLD AUTO: 0.44 10*3/MM3 (ref 0.1–0.9)
MONOCYTES NFR BLD AUTO: 10.2 % (ref 5–12)
NEUTROPHILS NFR BLD AUTO: 2.31 10*3/MM3 (ref 1.7–7)
NEUTROPHILS NFR BLD AUTO: 53.8 % (ref 42.7–76)
NRBC BLD AUTO-RTO: 0 /100 WBC (ref 0–0.2)
PLATELET # BLD AUTO: 309 10*3/MM3 (ref 140–450)
PMV BLD AUTO: 10.4 FL (ref 6–12)
POTASSIUM SERPL-SCNC: 4.7 MMOL/L (ref 3.5–5.2)
PROT SERPL-MCNC: 8.3 G/DL (ref 6–8.5)
RBC # BLD AUTO: 5.68 10*6/MM3 (ref 4.14–5.8)
SODIUM SERPL-SCNC: 141 MMOL/L (ref 136–145)
WBC # BLD AUTO: 4.3 10*3/MM3 (ref 3.4–10.8)

## 2020-12-18 NOTE — PROGRESS NOTES
Please notify the patient's mom and the patient that his kidney function looks normal.  His liver function is slightly elevated like they told me previously.  Recommend continuing with PCP.  His blood counts actually show an elevated hemoglobin level.  Recommend repeating this with PCP in 3 to 6 months.  Otherwise labs look okay.  Please fax copy of labs to PCP.  Thanks, KAT Lewis.

## 2020-12-18 NOTE — TELEPHONE ENCOUNTER
----- Message from KAT Lama sent at 12/18/2020  2:53 PM EST -----  Please notify the patient's mom and the patient that his kidney function looks normal.  His liver function is slightly elevated like they told me previously.  Recommend continuing with PCP.  His blood counts actually show an elevated hemoglobin level.  Recommend repeating this with PCP in 3 to 6 months.  Otherwise labs look okay.  Please fax copy of labs to PCP.  Thanks, KAT Lewis.

## 2020-12-21 LAB — LEVETIRACETAM SERPL-MCNC: 20.5 UG/ML (ref 10–40)

## 2020-12-22 ENCOUNTER — TELEPHONE (OUTPATIENT)
Dept: NEUROLOGY | Facility: CLINIC | Age: 25
End: 2020-12-22

## 2020-12-22 NOTE — TELEPHONE ENCOUNTER
----- Message from KAT Lama sent at 12/22/2020  8:06 AM EST -----  Please notify patient and his mom that his Keppra level is in a good range. Continue current dosage of Keppra. We will f/u as scheduled in office. Thanks, KAT Lewis Fwvalery copy of labs to PCP please.

## 2020-12-22 NOTE — PROGRESS NOTES
Please notify patient and his mom that his Keppra level is in a good range. Continue current dosage of Keppra. We will f/u as scheduled in office. Thanks, KAT Lewis copy of labs to PCP please.

## 2022-02-09 DIAGNOSIS — G40.209 PARTIAL SYMPTOMATIC EPILEPSY WITH COMPLEX PARTIAL SEIZURES, NOT INTRACTABLE, WITHOUT STATUS EPILEPTICUS: ICD-10-CM

## 2022-02-10 RX ORDER — LEVETIRACETAM 500 MG/1
TABLET ORAL
Qty: 180 TABLET | Refills: 1 | Status: SHIPPED | OUTPATIENT
Start: 2022-02-10 | End: 2022-05-20 | Stop reason: SDUPTHER

## 2022-02-10 NOTE — TELEPHONE ENCOUNTER
Patient last visit in office was 12/17/2020.  He canceled his 12/17/2021 appointment.  He needs a follow-up in clinic in the next 3 months.  I will refill the Keppra for that time.  If he cannot make his appointment he will need to get additional refills from his primary care provider.  Thanks, KAT Lewis.

## 2022-02-10 NOTE — TELEPHONE ENCOUNTER
Rx Refill Note  Requested Prescriptions     Pending Prescriptions Disp Refills   • levETIRAcetam (KEPPRA) 500 MG tablet [Pharmacy Med Name: LEVETIRACETAM 500 MG TABLET] 180 tablet 3     Sig: TAKE 1 TABLET BY MOUTH TWICE A DAY      Last office visit with prescribing clinician: 12/17/2020      Next office visit with prescribing clinician: JOANNE F/U SCHEDULED           Pricilla Gilmore CMA  02/10/22, 11:56 EST

## 2022-05-20 ENCOUNTER — OFFICE VISIT (OUTPATIENT)
Dept: NEUROLOGY | Facility: CLINIC | Age: 27
End: 2022-05-20

## 2022-05-20 ENCOUNTER — LAB (OUTPATIENT)
Dept: LAB | Facility: HOSPITAL | Age: 27
End: 2022-05-20

## 2022-05-20 VITALS
BODY MASS INDEX: 26.68 KG/M2 | HEIGHT: 67 IN | DIASTOLIC BLOOD PRESSURE: 80 MMHG | SYSTOLIC BLOOD PRESSURE: 130 MMHG | OXYGEN SATURATION: 98 % | WEIGHT: 170 LBS | HEART RATE: 80 BPM

## 2022-05-20 DIAGNOSIS — Z51.81 ENCOUNTER FOR THERAPEUTIC DRUG LEVEL MONITORING: ICD-10-CM

## 2022-05-20 DIAGNOSIS — G80.8 CEREBRAL PALSY, MONOPLEGIA: ICD-10-CM

## 2022-05-20 DIAGNOSIS — G40.209 PARTIAL SYMPTOMATIC EPILEPSY WITH COMPLEX PARTIAL SEIZURES, NOT INTRACTABLE, WITHOUT STATUS EPILEPTICUS: ICD-10-CM

## 2022-05-20 DIAGNOSIS — G40.209 PARTIAL SYMPTOMATIC EPILEPSY WITH COMPLEX PARTIAL SEIZURES, NOT INTRACTABLE, WITHOUT STATUS EPILEPTICUS: Primary | ICD-10-CM

## 2022-05-20 LAB
ALBUMIN SERPL-MCNC: 5.1 G/DL (ref 3.5–5.2)
ALBUMIN/GLOB SERPL: 1.7 G/DL
ALP SERPL-CCNC: 106 U/L (ref 39–117)
ALT SERPL W P-5'-P-CCNC: 163 U/L (ref 1–41)
ANION GAP SERPL CALCULATED.3IONS-SCNC: 14.5 MMOL/L (ref 5–15)
AST SERPL-CCNC: 103 U/L (ref 1–40)
BASOPHILS # BLD AUTO: 0.09 10*3/MM3 (ref 0–0.2)
BASOPHILS NFR BLD AUTO: 2.2 % (ref 0–1.5)
BILIRUB SERPL-MCNC: 0.4 MG/DL (ref 0–1.2)
BUN SERPL-MCNC: 12 MG/DL (ref 6–20)
BUN/CREAT SERPL: 12.9 (ref 7–25)
CALCIUM SPEC-SCNC: 9.9 MG/DL (ref 8.6–10.5)
CHLORIDE SERPL-SCNC: 99 MMOL/L (ref 98–107)
CO2 SERPL-SCNC: 25.5 MMOL/L (ref 22–29)
CREAT SERPL-MCNC: 0.93 MG/DL (ref 0.76–1.27)
DEPRECATED RDW RBC AUTO: 43.7 FL (ref 37–54)
EGFRCR SERPLBLD CKD-EPI 2021: 116.9 ML/MIN/1.73
EOSINOPHIL # BLD AUTO: 0.33 10*3/MM3 (ref 0–0.4)
EOSINOPHIL NFR BLD AUTO: 8.1 % (ref 0.3–6.2)
ERYTHROCYTE [DISTWIDTH] IN BLOOD BY AUTOMATED COUNT: 12.6 % (ref 12.3–15.4)
GLOBULIN UR ELPH-MCNC: 3 GM/DL
GLUCOSE SERPL-MCNC: 76 MG/DL (ref 65–99)
HCT VFR BLD AUTO: 52.9 % (ref 37.5–51)
HGB BLD-MCNC: 18 G/DL (ref 13–17.7)
IMM GRANULOCYTES # BLD AUTO: 0.01 10*3/MM3 (ref 0–0.05)
IMM GRANULOCYTES NFR BLD AUTO: 0.2 % (ref 0–0.5)
LYMPHOCYTES # BLD AUTO: 1.35 10*3/MM3 (ref 0.7–3.1)
LYMPHOCYTES NFR BLD AUTO: 33.1 % (ref 19.6–45.3)
MCH RBC QN AUTO: 32.3 PG (ref 26.6–33)
MCHC RBC AUTO-ENTMCNC: 34 G/DL (ref 31.5–35.7)
MCV RBC AUTO: 95 FL (ref 79–97)
MONOCYTES # BLD AUTO: 0.41 10*3/MM3 (ref 0.1–0.9)
MONOCYTES NFR BLD AUTO: 10 % (ref 5–12)
NEUTROPHILS NFR BLD AUTO: 1.89 10*3/MM3 (ref 1.7–7)
NEUTROPHILS NFR BLD AUTO: 46.4 % (ref 42.7–76)
NRBC BLD AUTO-RTO: 0 /100 WBC (ref 0–0.2)
PLATELET # BLD AUTO: 303 10*3/MM3 (ref 140–450)
PMV BLD AUTO: 10.5 FL (ref 6–12)
POTASSIUM SERPL-SCNC: 4.9 MMOL/L (ref 3.5–5.2)
PROT SERPL-MCNC: 8.1 G/DL (ref 6–8.5)
RBC # BLD AUTO: 5.57 10*6/MM3 (ref 4.14–5.8)
SODIUM SERPL-SCNC: 139 MMOL/L (ref 136–145)
WBC NRBC COR # BLD: 4.08 10*3/MM3 (ref 3.4–10.8)

## 2022-05-20 PROCEDURE — 85025 COMPLETE CBC W/AUTO DIFF WBC: CPT

## 2022-05-20 PROCEDURE — 80177 DRUG SCRN QUAN LEVETIRACETAM: CPT

## 2022-05-20 PROCEDURE — 99213 OFFICE O/P EST LOW 20 MIN: CPT | Performed by: NURSE PRACTITIONER

## 2022-05-20 PROCEDURE — 36415 COLL VENOUS BLD VENIPUNCTURE: CPT

## 2022-05-20 PROCEDURE — 80053 COMPREHEN METABOLIC PANEL: CPT

## 2022-05-20 RX ORDER — PRAVASTATIN SODIUM 10 MG
10 TABLET ORAL NIGHTLY
COMMUNITY

## 2022-05-20 RX ORDER — LEVETIRACETAM 500 MG/1
500 TABLET ORAL 2 TIMES DAILY
Qty: 180 TABLET | Refills: 3 | Status: SHIPPED | OUTPATIENT
Start: 2022-05-20

## 2022-05-20 NOTE — PROGRESS NOTES
"Subjective:     Patient ID: Luiz Sofia is a 26 y.o. male.    CC:   Chief Complaint   Patient presents with   • Seizures       HPI:   History of Present Illness     This is a 26-year-old male who presents for 18-month neurology follow-up on complex partial seizure history, with onset at age 4. He was last seen in clinic on 12/17/2020. He had a premature birth with extended NICU stay, with a diagnosis of cerebral palsy with continued left upper and left lower extremity weakness. He has been on Keppra 500 mg, twice a day, and has tolerated this very well without any side effects. He has been seizure free since around age 13. His family has wished to continue his seizure medications. His seizures in the remote past have been precipitated with aura, followed by left eye gaze, Ramos's paralysis, urinary incontinence, left-sided jerking, and lasting 3 to 5 minutes per his mother.     He is with his mother during today's visit who confirms all of the information he provides.    He does stay active with special [employer]. He has a very supportive family. Previously had multiple MRIs, CT scans of the head, and EEG in the past showing abnormal spikes consistent with focal seizures. Imaging per his mother looked \"okay overall\"-all per report. He does have the [facility] to help with services.    The patient and mother deny that patient has had any seizures since his last visit. He confirms he is still taking Keppra 500mg, twice daily. His mother states that the  PCP rechecked the liver enzymes and they came back normal however he found that patient's cholesterol was slightly elevated. He is now on pravastatin 10mg daily. She and patient deny any other changes to patient's health and any other drug allergies. The patient states he is doing well at home, staying active and still rowing 5-6 days a week. He denies having any falls since his last visit. His mother denies having had blood work recently and states his last " blood work done was at his last visit approximately 1 year ago.    The following portions of the patient's history were reviewed and updated as appropriate: allergies, current medications, past family history, past medical history, past social history, past surgical history and problem list.    Past Medical History:   Diagnosis Date   • Cerebral palsy, monoplegia (HCC) 1996   • Developmental delay    • Difficulty walking    • HL (hearing loss)    • Hyperlipidemia    • Neurological symptoms    • Seizures (HCC)        Past Surgical History:   Procedure Laterality Date   • EYE SURGERY      Retina Reattached        Social History     Socioeconomic History   • Marital status: Single   Tobacco Use   • Smoking status: Never Smoker   • Smokeless tobacco: Never Used   Vaping Use   • Vaping Use: Never used   Substance and Sexual Activity   • Alcohol use: No   • Drug use: Never   • Sexual activity: Not Currently       Family History   Problem Relation Age of Onset   • Parkinsonism Other         Grandmother   • Diabetes Father    • Hypertension Father    • Parkinsonism Maternal Grandmother           Current Outpatient Medications:   •  levETIRAcetam (KEPPRA) 500 MG tablet, Take 1 tablet by mouth 2 (Two) Times a Day., Disp: 180 tablet, Rfl: 3  •  pravastatin (PRAVACHOL) 10 MG tablet, Take 10 mg by mouth Every Night., Disp: , Rfl:      Review of Systems   Constitutional: Negative for chills, fatigue, fever and unexpected weight change.   HENT: Negative for ear pain, hearing loss, nosebleeds, rhinorrhea and sore throat.    Eyes: Negative for photophobia, pain, discharge, itching and visual disturbance.   Respiratory: Negative for cough, chest tightness, shortness of breath and wheezing.    Cardiovascular: Negative for chest pain, palpitations and leg swelling.   Gastrointestinal: Negative for abdominal pain, blood in stool, constipation, diarrhea, nausea and vomiting.   Genitourinary: Negative for dysuria, frequency, hematuria and  "urgency.   Musculoskeletal: Negative for arthralgias, back pain, gait problem, joint swelling, myalgias, neck pain and neck stiffness.   Skin: Negative for rash and wound.   Allergic/Immunologic: Negative for environmental allergies and food allergies.   Neurological: Negative for dizziness, tremors, seizures, syncope, speech difficulty, weakness, light-headedness, numbness and headaches.   Hematological: Negative for adenopathy. Does not bruise/bleed easily.   Psychiatric/Behavioral: Negative for agitation, confusion, decreased concentration, hallucinations, sleep disturbance and suicidal ideas. The patient is not nervous/anxious.    All other systems reviewed and are negative.       Objective:  /80   Pulse 80   Ht 170.2 cm (67\")   Wt 77.1 kg (170 lb)   SpO2 98%   BMI 26.63 kg/m²     Neurologic Exam     Mental Status   Oriented to person, place, and time.   Speech: speech is normal   Level of consciousness: alert    Cranial Nerves   Cranial nerves II through XII intact.     Motor Exam   Muscle bulk: normal  Right arm tone: normal  Left arm tone: decreased  Right leg tone: normal  Left leg tone: decreased    Strength   Strength 5/5 except as noted.   LUE 4-5/5, LLE 4-5/5, SOME MILD FOOT DROP, MONOPLEGIA CHRONIC WITH CP     Gait, Coordination, and Reflexes     Gait  Gait: (mild antalgia d/t CP monoplegia)    Coordination   Finger to nose coordination: normal    Tremor   Resting tremor: absent  Intention tremor: absent  Action tremor: absent    Reflexes   Reflexes 2+ except as noted.   Right : 2+  Left : 2+      Physical Exam  Constitutional:       Appearance: Normal appearance.   Neurological:      Mental Status: He is alert and oriented to person, place, and time.      Coordination: Finger-Nose-Finger Test normal.   Psychiatric:         Mood and Affect: Mood and affect normal.         Speech: Speech normal.         Behavior: Behavior normal.         Thought Content: Thought content normal.         " Cognition and Memory: Cognition and memory normal.         Judgment: Judgment normal.     Results:    His last labs were on 12/17/2020 with Keppra level 20.5 mcg mL, normal. CBC with differential showed hemoglobin 18.2 gm/dL and hematocrit 51.6 percent, both slightly elevated, but otherwise was normal. And comprehensive metabolic panel showed some elevation in liver function with ALT 66 U/L and AST 41 U/L. And we faxed those results to PCP to follow up.     Assessment/Plan:       Diagnoses and all orders for this visit:    1. Partial symptomatic epilepsy with complex partial seizures, not intractable, without status epilepticus (HCC) (Primary)  -     levETIRAcetam (KEPPRA) 500 MG tablet; Take 1 tablet by mouth 2 (Two) Times a Day.  Dispense: 180 tablet; Refill: 3  -     CBC & Differential; Future  -     Comprehensive Metabolic Panel; Future  -     Levetiracetam Level (Keppra); Future    2. Cerebral palsy, monoplegia (HCC)    3. Encounter for therapeutic drug level monitoring  -     CBC & Differential; Future  -     Comprehensive Metabolic Panel; Future  -     Levetiracetam Level (Keppra); Future    I put a refill prescription for his medications for 1 year.    I put in an order for his blood work today.    I educated patient and his mother that with any kind of epilepsy you can have sudden unexpected death in sleep and this is more likely possible when people do not have their seizures under control or experience nocturnal seizures for which he has neither at this time.    I informed them that there is a new rescue medicine, a nasal spray-either nayzilam or valtoco that can be used PRN for sudden seizure onset- we could prescribe if patient/mom wishes. I do not think he needs it at present. The patient and his mother decline for today. They will contact us if they wish us to prescribe it.    Follow-up in 1 year or sooner if needed.     Reviewed medications, potential side effects and signs and symptoms to report.  "Discussed risk versus benefits of treatment plan with patient and/or family-including medications, labs and radiology that may be ordered. Addressed questions and concerns during visit. Patient and/or family verbalized understanding and agree with plan.    AS THE PROVIDER, I PERSONALLY WORE PPE DURING ENTIRE FACE TO FACE ENCOUNTER IN CLINIC WITH THE PATIENT. PATIENT ALSO WORE PPE DURING ENTIRE FACE TO FACE ENCOUNTER EXCEPT FOR A MAX OF 30 SECONDS DURING NEUROLOGICAL EVALUATION OF CRANIAL NERVES AND THEN MASK WAS PLACED BACK OVER PATIENT FACE FOR REMAINDER OF VISIT. I WASHED MY HANDS BEFORE AND AFTER VISIT.     Patient instructions include: No driving or operating heavy machinery, solo bathing or tub baths for 90 days from onset of most recent seizure, if they currently hold a license. Minimize stress as much as possible. Recommended 7-8 hours of sleep each night. Abstain from alcohol intake. Educated on Antiepileptic medications with possible side effects and signs and symptoms to report if prescribed during visit. Instructed to take seizure medication daily if prescribed. Reviewed potential seizure risk factors. Instructed to call 911 or our office if another seizure does occur.    I also discussed SUDEP risk with the patient and/or family today. Sudden Unexpected Death in Epilepsy is defined as \"the sudden, unexpected, witnessed or unwitnessed, non-traumatic, and non-drowning death in patients with epilepsy with or without evidence for a seizure, and excluding documented status epilepticus, in which postmortem examination does not reveal a structural or toxicological cause for death.\"-Epilepsy Foundation 2021. SUDEP is most commonly seen in Epilepsy patient's who's seizures are not well controlled or who have seizures during their sleep. This condition is not fully understood. To reduce your risk of SUDEP, please take your seizure medications as prescribed, keep a diary of your seizures and when they occur and if " your seizures are not well controlled, please notify us so we can collaborate with you to get your seizures better controlled. If you have additional questions, please visit: [email or web address].com for more information.    During this visit the following were done:  Labs Reviewed [x]    Labs Ordered [x]  We will fax lab results to PCP  Radiology Reports Reviewed []    Radiology Ordered []    PCP Records Reviewed []    Referring Provider Records Reviewed []    ER Records Reviewed []    Hospital Records Reviewed []    History Obtained From Family []    Radiology Images Reviewed []    Other Reviewed []    Records Requested []      Transcribed from ambient dictation for KAT Lama by Rosie Angel.  05/20/22   11:14 EDT    Patient verbalized consent to the visit recording.  I have personally performed the services described in this document as transcribed by the above individual, and it is both accurate and complete.  KAT Lama  5/20/2022  12:14 EDT

## 2022-05-23 NOTE — PROGRESS NOTES
Please notify patient and his mom that his liver enzymes are significantly elevated. Please call and notify PCP and fax labs to PCP. Patient needs to be seen by PCP for further evaluation of elevated liver enzymes- (normal 1-41) and  (normal 1-40). The Keppra does not affect the liver. He was placed on a new cholesterol medicine per he and his moms report. Please make sure they go see PCP for further eval and PCP clinical staff are aware of labs and contact patient for appointment. Thanks, KAT Lewis

## 2022-05-24 LAB — LEVETIRACETAM SERPL-MCNC: 15.4 UG/ML (ref 10–40)

## 2022-05-25 ENCOUNTER — TELEPHONE (OUTPATIENT)
Dept: NEUROLOGY | Facility: CLINIC | Age: 27
End: 2022-05-25

## 2022-05-25 NOTE — PROGRESS NOTES
Please let patient/mom know his Keppra level is normal. Ensure they are seeing PCP about elevated liver enzymes based on prior results message. Thanks, KAT Lewis

## 2022-06-01 NOTE — TELEPHONE ENCOUNTER
Mom notified of labs and results message. She said they have an appointment on Mon 6/6/22 with PCP. Labs were faxed

## 2023-05-22 ENCOUNTER — OFFICE VISIT (OUTPATIENT)
Dept: NEUROLOGY | Facility: CLINIC | Age: 28
End: 2023-05-22
Payer: MEDICAID

## 2023-05-22 VITALS
HEIGHT: 66 IN | OXYGEN SATURATION: 98 % | BODY MASS INDEX: 25.07 KG/M2 | HEART RATE: 80 BPM | SYSTOLIC BLOOD PRESSURE: 102 MMHG | WEIGHT: 156 LBS | DIASTOLIC BLOOD PRESSURE: 66 MMHG

## 2023-05-22 DIAGNOSIS — G40.209 PARTIAL SYMPTOMATIC EPILEPSY WITH COMPLEX PARTIAL SEIZURES, NOT INTRACTABLE, WITHOUT STATUS EPILEPTICUS: Primary | ICD-10-CM

## 2023-05-22 PROCEDURE — 1159F MED LIST DOCD IN RCRD: CPT | Performed by: NURSE PRACTITIONER

## 2023-05-22 PROCEDURE — 1160F RVW MEDS BY RX/DR IN RCRD: CPT | Performed by: NURSE PRACTITIONER

## 2023-05-22 PROCEDURE — 99213 OFFICE O/P EST LOW 20 MIN: CPT | Performed by: NURSE PRACTITIONER

## 2023-05-22 RX ORDER — LEVETIRACETAM 500 MG/1
500 TABLET ORAL 2 TIMES DAILY
Qty: 180 TABLET | Refills: 3 | Status: SHIPPED | OUTPATIENT
Start: 2023-05-22

## 2023-05-22 RX ORDER — VITAMIN B COMPLEX
CAPSULE ORAL DAILY
COMMUNITY

## 2023-05-22 NOTE — LETTER
"May 22, 2023     Ramos Barajas MD  [address]  [address]  [facility] [address]    Patient: Luiz Sofia   YOB: 1995   Date of Visit: 5/22/2023       Dear Ramos Barajas MD    Luiz Sofia was in my office today. Below is a copy of my note.    If you have questions, please do not hesitate to call me. I look forward to following Luiz along with you.         Sincerely,        KAT Lama        CC: No Recipients    Subjective:     Patient ID: Luiz Sofia is a 27 y.o. male.    CC:   Chief Complaint   Patient presents with   • Seizures       HPI:   History of Present Illness   Today 5/22/2023-  This is a 27-year-old male who presents for 1-year neurology follow-up on complex partial seizures, which have been present since he was 4 years of age. He is accompanied by his mother during today's visit. He has cerebral palsy. He has been seizure free since around age 13. He stays active with the [employer]. He is currently taking Keppra 500 mg twice per day.    At his last visit in clinic on 05/20/2022, he had CBC with differential with hemoglobin 18.0 g/dL, hematocrit 52.9 percent, which was stable compared to prior labs, otherwise unremarkable. Comprehensive metabolic panel with  U/L,  U/L, otherwise normal. His Keppra level was within normal limits at 15.4 ug/mL.    He does admit today that he does drink about 10 beers per week. He recently had labs with his primary care provider, so we are going to request a copy of those for review.    Today, he reports he is doing well. He denies any changes in his health since his last visit. He denies any seizures. He confirms he is still taking Keppra, milk thistle, and a B complex vitamin.    His mother states his liver enzymes are \"back down\". They confirm he drinks approximately 10 beers per week.    He reports he has lost weight by exercising. He states he has a rowing machine and walks his dog daily. He denies any falls. " "    He denies any changes in his history, surgeries, hospitalizations, or illnesses. They deny having any new concerns. He reports he is tired today.     He confirms he gets his eyes checked annually.    He continues to participate in the [employer]. He will be starting practice in 06/2023. He participates in basketball in the winter and softball in the summer.     Prior Neurological Workup and History:  Complex partial seizures, with onset at age 4. He had a premature birth with extended NICU stay, with a diagnosis of cerebral palsy with continued left upper and left lower extremity weakness. He has been on Keppra 500 mg, twice a day, and has tolerated this very well without any side effects.  He has been seizure free since around age 13.   His family has wished to continue his seizure medications.   His seizures in the remote past have been precipitated with aura, followed by left eye gaze, Ramos's paralysis, urinary incontinence, left-sided jerking, and lasting 3 to 5 minutes per his mother.      He does stay active with special [employer]. He has a very supportive family.     Previously had multiple MRIs, CT scans of the head, and EEG in the past showing abnormal spikes consistent with focal seizures. Imaging per his mother looked \"okay overall\"-all per report. He does have the [facility] to help with services.    The following portions of the patient's history were reviewed and updated as appropriate: allergies, current medications, past family history, past medical history, past social history, past surgical history and problem list.    Past Medical History:   Diagnosis Date   • Cerebral palsy, monoplegia 1996   • Developmental delay    • Difficulty walking    • HL (hearing loss)    • Hyperlipidemia    • Movement disorder    • Neurological symptoms    • Seizures        Past Surgical History:   Procedure Laterality Date   • EYE SURGERY      Retina Reattached        Social History     Socioeconomic " "History   • Marital status: Single   Tobacco Use   • Smoking status: Never     Passive exposure: Never   • Smokeless tobacco: Never   Vaping Use   • Vaping Use: Never used   Substance and Sexual Activity   • Alcohol use: Yes     Alcohol/week: 10.0 standard drinks     Types: 10 Cans of beer per week   • Drug use: Never   • Sexual activity: Not Currently       Family History   Problem Relation Age of Onset   • Parkinsonism Other         Grandmother   • Diabetes Father    • Hypertension Father    • Parkinsonism Maternal Grandmother           Current Outpatient Medications:   •  B Complex Vitamins (vitamin b complex) capsule capsule, Take  by mouth Daily., Disp: , Rfl:   •  levETIRAcetam (KEPPRA) 500 MG tablet, Take 1 tablet by mouth 2 (Two) Times a Day., Disp: 180 tablet, Rfl: 3  •  MILK THISTLE PO, Take  by mouth Daily., Disp: , Rfl:      Review of Systems   Neurological: Negative for seizures and headaches.   All other systems reviewed and are negative.       Objective:  /66   Pulse 80   Ht 167.6 cm (66\")   Wt 70.8 kg (156 lb)   SpO2 98%   BMI 25.18 kg/m²     Neurologic Exam  Mental Status   Oriented to person, place, and time.   Speech: speech is normal   Level of consciousness: alert     Cranial Nerves   Cranial nerves II through XII intact.      Motor Exam   Muscle bulk: normal  Right arm tone: normal  Left arm tone: decreased  Right leg tone: normal  Left leg tone: decreased     Strength   Strength 5/5 except as noted.   LUE 4-5/5, LLE 4-5/5, SOME MILD FOOT DROP, MONOPLEGIA CHRONIC WITH CP      Gait, Coordination, and Reflexes      Gait  Gait: (mild antalgia d/t CP monoplegia)     Coordination   Finger to nose coordination: normal     Tremor   Resting tremor: absent  Intention tremor: absent  Action tremor: absent     Reflexes   Reflexes 2+ except as noted.   Right : 2+  Left : 2+     Physical Exam    Constitutional:       Appearance: Normal appearance.   Neurological:      Mental Status: He is " "alert and oriented to person, place, and time.      Coordination: Finger-Nose-Finger Test normal.   Psychiatric:         Mood and Affect: Mood and affect normal.         Speech: Speech normal.         Behavior: Behavior normal.         Thought Content: Thought content normal.         Cognition and Memory: Cognition and memory normal.         Judgment: Judgment normal.     Assessment/Plan:      Diagnoses and all orders for this visit:    1. Partial symptomatic epilepsy with complex partial seizures, not intractable, without status epilepticus (Primary)  -     levETIRAcetam (KEPPRA) 500 MG tablet; Take 1 tablet by mouth 2 (Two) Times a Day.  Dispense: 180 tablet; Refill: 3        He is with his mom today. They have no new concerns. He just had labs with PCP, which we have requested. We will continue his Keppra unchanged. We will follow up in 1 year or sooner if needed.    Reviewed medications, potential side effects and signs and symptoms to report. Discussed risk versus benefits of treatment plan with patient and/or family-including medications, labs and radiology that may be ordered. Addressed questions and concerns during visit. Patient and/or family verbalized understanding and agree with plan.    Patient instructions include: No driving or operating heavy machinery, solo bathing or tub baths for 90 days from onset of most recent seizure, if they currently hold a license. Minimize stress as much as possible. Recommended 7-8 hours of sleep each night. Abstain from alcohol intake. Educated on Antiepileptic medications with possible side effects and signs and symptoms to report if prescribed during visit. Instructed to take seizure medication daily if prescribed. Reviewed potential seizure risk factors. Instructed to call 911 or our office if another seizure does occur.    Sudden Unexpected Death in Epilepsy is defined as \"the sudden, unexpected, witnessed or unwitnessed, non-traumatic, and non-drowning death in " "patients with epilepsy with or without evidence for a seizure, and excluding documented status epilepticus, in which postmortem examination does not reveal a structural or toxicological cause for death.\"-Epilepsy Foundation 2021. SUDEP is most commonly seen in Epilepsy patient's who's seizures are not well controlled or who have seizures during their sleep. This condition is not fully understood. To reduce your risk of SUDEP, please take your seizure medications as prescribed, keep a diary of your seizures and when they occur and if your seizures are not well controlled, please notify us so we can collaborate with you to get your seizures better controlled. If you have additional questions, please visit: [email or web address] for more information.    During this visit the following were done:  Labs Reviewed [x]  Lab results from primary care provider received following visit.  Labs were completed on 4/27/2023.  CBC within normal limits.  Comprehensive metabolic panel with glucose 100, alkaline phosphatase 125 slightly elevated, AST 44 slightly elevated and ALT 71 slightly elevated but improved from prior labs.  Lipid panel with total cholesterol 219, triglycerides 167, HDL 46 and .  Hemoglobin A1c 5.1%.  TSH normal at 2.870.  Levetiracetam level normal at 10.8.  Labs Ordered []    Radiology Reports Reviewed []    Radiology Ordered []    PCP Records Reviewed []    Referring Provider Records Reviewed []    ER Records Reviewed []    Hospital Records Reviewed []    History Obtained From Family [x]  mom  Radiology Images Reviewed []    Other Reviewed [x]    Records Requested [x]      Transcribed from ambient dictation for KAT Lama by Sarahi Bobby.  05/22/23   11:47 EDT    Patient or patient representative verbalized consent to the visit recording.  I have personally performed the services described in this document as transcribed by the above individual, and it is both accurate and complete.  Edna " Jonny García, KAT  5/22/2023  13:00 EDT

## 2023-05-22 NOTE — PROGRESS NOTES
"Subjective:     Patient ID: Luiz Sofia is a 27 y.o. male.    CC:   Chief Complaint   Patient presents with   • Seizures       HPI:   History of Present Illness   Today 5/22/2023-  This is a 27-year-old male who presents for 1-year neurology follow-up on complex partial seizures, which have been present since he was 4 years of age. He is accompanied by his mother during today's visit. He has cerebral palsy. He has been seizure free since around age 13. He stays active with the [employer]. He is currently taking Keppra 500 mg twice per day.    At his last visit in clinic on 05/20/2022, he had CBC with differential with hemoglobin 18.0 g/dL, hematocrit 52.9 percent, which was stable compared to prior labs, otherwise unremarkable. Comprehensive metabolic panel with  U/L,  U/L, otherwise normal. His Keppra level was within normal limits at 15.4 ug/mL.    He does admit today that he does drink about 10 beers per week. He recently had labs with his primary care provider, so we are going to request a copy of those for review.    Today, he reports he is doing well. He denies any changes in his health since his last visit. He denies any seizures. He confirms he is still taking Keppra, milk thistle, and a B complex vitamin.    His mother states his liver enzymes are \"back down\". They confirm he drinks approximately 10 beers per week.    He reports he has lost weight by exercising. He states he has a rowing machine and walks his dog daily. He denies any falls.     He denies any changes in his history, surgeries, hospitalizations, or illnesses. They deny having any new concerns. He reports he is tired today.     He confirms he gets his eyes checked annually.    He continues to participate in the [employer]. He will be starting practice in 06/2023. He participates in basketball in the winter and softball in the summer.     Prior Neurological Workup and History:  Complex partial seizures, with onset at " "age 4. He had a premature birth with extended NICU stay, with a diagnosis of cerebral palsy with continued left upper and left lower extremity weakness. He has been on Keppra 500 mg, twice a day, and has tolerated this very well without any side effects.  He has been seizure free since around age 13.   His family has wished to continue his seizure medications.   His seizures in the remote past have been precipitated with aura, followed by left eye gaze, Ramos's paralysis, urinary incontinence, left-sided jerking, and lasting 3 to 5 minutes per his mother.      He does stay active with special [employer]. He has a very supportive family.     Previously had multiple MRIs, CT scans of the head, and EEG in the past showing abnormal spikes consistent with focal seizures. Imaging per his mother looked \"okay overall\"-all per report. He does have the [facility] to help with services.    The following portions of the patient's history were reviewed and updated as appropriate: allergies, current medications, past family history, past medical history, past social history, past surgical history and problem list.    Past Medical History:   Diagnosis Date   • Cerebral palsy, monoplegia 1996   • Developmental delay    • Difficulty walking    • HL (hearing loss)    • Hyperlipidemia    • Movement disorder    • Neurological symptoms    • Seizures        Past Surgical History:   Procedure Laterality Date   • EYE SURGERY      Retina Reattached        Social History     Socioeconomic History   • Marital status: Single   Tobacco Use   • Smoking status: Never     Passive exposure: Never   • Smokeless tobacco: Never   Vaping Use   • Vaping Use: Never used   Substance and Sexual Activity   • Alcohol use: Yes     Alcohol/week: 10.0 standard drinks     Types: 10 Cans of beer per week   • Drug use: Never   • Sexual activity: Not Currently       Family History   Problem Relation Age of Onset   • Parkinsonism Other         Grandmother   • " "Diabetes Father    • Hypertension Father    • Parkinsonism Maternal Grandmother           Current Outpatient Medications:   •  B Complex Vitamins (vitamin b complex) capsule capsule, Take  by mouth Daily., Disp: , Rfl:   •  levETIRAcetam (KEPPRA) 500 MG tablet, Take 1 tablet by mouth 2 (Two) Times a Day., Disp: 180 tablet, Rfl: 3  •  MILK THISTLE PO, Take  by mouth Daily., Disp: , Rfl:      Review of Systems   Neurological: Negative for seizures and headaches.   All other systems reviewed and are negative.       Objective:  /66   Pulse 80   Ht 167.6 cm (66\")   Wt 70.8 kg (156 lb)   SpO2 98%   BMI 25.18 kg/m²     Neurologic Exam  Mental Status   Oriented to person, place, and time.   Speech: speech is normal   Level of consciousness: alert     Cranial Nerves   Cranial nerves II through XII intact.      Motor Exam   Muscle bulk: normal  Right arm tone: normal  Left arm tone: decreased  Right leg tone: normal  Left leg tone: decreased     Strength   Strength 5/5 except as noted.   LUE 4-5/5, LLE 4-5/5, SOME MILD FOOT DROP, MONOPLEGIA CHRONIC WITH CP      Gait, Coordination, and Reflexes      Gait  Gait: (mild antalgia d/t CP monoplegia)     Coordination   Finger to nose coordination: normal     Tremor   Resting tremor: absent  Intention tremor: absent  Action tremor: absent     Reflexes   Reflexes 2+ except as noted.   Right : 2+  Left : 2+     Physical Exam    Constitutional:       Appearance: Normal appearance.   Neurological:      Mental Status: He is alert and oriented to person, place, and time.      Coordination: Finger-Nose-Finger Test normal.   Psychiatric:         Mood and Affect: Mood and affect normal.         Speech: Speech normal.         Behavior: Behavior normal.         Thought Content: Thought content normal.         Cognition and Memory: Cognition and memory normal.         Judgment: Judgment normal.     Assessment/Plan:       Diagnoses and all orders for this visit:    1. Partial " "symptomatic epilepsy with complex partial seizures, not intractable, without status epilepticus (Primary)  -     levETIRAcetam (KEPPRA) 500 MG tablet; Take 1 tablet by mouth 2 (Two) Times a Day.  Dispense: 180 tablet; Refill: 3         He is with his mom today. They have no new concerns. He just had labs with PCP, which we have requested. We will continue his Keppra unchanged. We will follow up in 1 year or sooner if needed.    Reviewed medications, potential side effects and signs and symptoms to report. Discussed risk versus benefits of treatment plan with patient and/or family-including medications, labs and radiology that may be ordered. Addressed questions and concerns during visit. Patient and/or family verbalized understanding and agree with plan.    Patient instructions include: No driving or operating heavy machinery, solo bathing or tub baths for 90 days from onset of most recent seizure, if they currently hold a license. Minimize stress as much as possible. Recommended 7-8 hours of sleep each night. Abstain from alcohol intake. Educated on Antiepileptic medications with possible side effects and signs and symptoms to report if prescribed during visit. Instructed to take seizure medication daily if prescribed. Reviewed potential seizure risk factors. Instructed to call 911 or our office if another seizure does occur.    Sudden Unexpected Death in Epilepsy is defined as \"the sudden, unexpected, witnessed or unwitnessed, non-traumatic, and non-drowning death in patients with epilepsy with or without evidence for a seizure, and excluding documented status epilepticus, in which postmortem examination does not reveal a structural or toxicological cause for death.\"-Epilepsy Foundation 2021. SUDEP is most commonly seen in Epilepsy patient's who's seizures are not well controlled or who have seizures during their sleep. This condition is not fully understood. To reduce your risk of SUDEP, please take your seizure " medications as prescribed, keep a diary of your seizures and when they occur and if your seizures are not well controlled, please notify us so we can collaborate with you to get your seizures better controlled. If you have additional questions, please visit: [email or web address] for more information.    During this visit the following were done:  Labs Reviewed [x]  Lab results from primary care provider received following visit.  Labs were completed on 4/27/2023.  CBC within normal limits.  Comprehensive metabolic panel with glucose 100, alkaline phosphatase 125 slightly elevated, AST 44 slightly elevated and ALT 71 slightly elevated but improved from prior labs.  Lipid panel with total cholesterol 219, triglycerides 167, HDL 46 and .  Hemoglobin A1c 5.1%.  TSH normal at 2.870.  Levetiracetam level normal at 10.8.  Labs Ordered []    Radiology Reports Reviewed []    Radiology Ordered []    PCP Records Reviewed []    Referring Provider Records Reviewed []    ER Records Reviewed []    Hospital Records Reviewed []    History Obtained From Family [x]  mom  Radiology Images Reviewed []    Other Reviewed [x]    Records Requested [x]      Transcribed from ambient dictation for KAT Lama by Sarahi Bobby.  05/22/23   11:47 EDT    Patient or patient representative verbalized consent to the visit recording.  I have personally performed the services described in this document as transcribed by the above individual, and it is both accurate and complete.  KAT Lama  5/22/2023  13:00 EDT

## 2024-05-16 ENCOUNTER — OFFICE VISIT (OUTPATIENT)
Dept: NEUROLOGY | Facility: CLINIC | Age: 29
End: 2024-05-16
Payer: MEDICAID

## 2024-05-16 VITALS
BODY MASS INDEX: 24.11 KG/M2 | HEART RATE: 52 BPM | DIASTOLIC BLOOD PRESSURE: 80 MMHG | HEIGHT: 66 IN | SYSTOLIC BLOOD PRESSURE: 120 MMHG | OXYGEN SATURATION: 98 % | WEIGHT: 150 LBS

## 2024-05-16 DIAGNOSIS — G80.8 CEREBRAL PALSY, MONOPLEGIA: Primary | ICD-10-CM

## 2024-05-16 DIAGNOSIS — G40.209 PARTIAL SYMPTOMATIC EPILEPSY WITH COMPLEX PARTIAL SEIZURES, NOT INTRACTABLE, WITHOUT STATUS EPILEPTICUS: ICD-10-CM

## 2024-05-16 PROBLEM — K76.0 STEATOSIS OF LIVER: Status: ACTIVE | Noted: 2022-10-21

## 2024-05-16 PROBLEM — Z78.9 ADMITS TO ALCOHOL CONSUMPTION: Status: ACTIVE | Noted: 2022-10-21

## 2024-05-16 PROBLEM — E78.5 HYPERLIPIDEMIA: Status: ACTIVE | Noted: 2022-10-21

## 2024-05-16 PROBLEM — G80.9 CEREBRAL PALSY: Status: ACTIVE | Noted: 2022-10-21

## 2024-05-16 PROCEDURE — 1160F RVW MEDS BY RX/DR IN RCRD: CPT | Performed by: NURSE PRACTITIONER

## 2024-05-16 PROCEDURE — 99213 OFFICE O/P EST LOW 20 MIN: CPT | Performed by: NURSE PRACTITIONER

## 2024-05-16 PROCEDURE — 1159F MED LIST DOCD IN RCRD: CPT | Performed by: NURSE PRACTITIONER

## 2024-05-16 RX ORDER — LEVETIRACETAM 500 MG/1
500 TABLET ORAL 2 TIMES DAILY
Qty: 180 TABLET | Refills: 3 | Status: SHIPPED | OUTPATIENT
Start: 2024-05-16

## 2024-05-16 RX ORDER — EZETIMIBE 10 MG/1
1 TABLET ORAL DAILY
COMMUNITY

## 2024-05-16 NOTE — PROGRESS NOTES
Subjective:     Patient ID: Luiz Sofia is a 28 y.o. male.    CC:   Chief Complaint   Patient presents with    Seizures       HPI:   History of Present Illness  This is a 28-year-old male who presents for 1-year neurology follow-up and complex partial seizures present since age 4. He also has cerebral palsy. He has been seizure-free since age 13. He stays active with rowing at home, push ups and weight lifting. He used to be involved in the [employer], but since his dad has had some health changes, he is no longer participating. He has been taking Keppra 500 mg twice per day long-term for seizure prevention. He does drink a significant amount of alcohol. He has been following with his primary care provider for some elevated liver enzymes. He has steatosis of the liver. He does get labs with his primary care provider. He is here for follow-up and refills on medications. He is accompanied by his mother during today's visit.    The patient reports no alterations in his health status or seizure activity since his last consultation. His primary care physician conducted a comprehensive blood panel approximately 2 weeks ago, which indicated satisfactory liver function. Despite this, he continues to consume alcohol. His cholesterol levels were marginally elevated, with a follow-up appointment scheduled in 3 months to monitor his cholesterol levels. He has ceased participation in the [employer]. He maintains an active lifestyle, walking his dog daily, utilizing the rower, and performing push-ups and strength training at home. He denies any new concerns at this time. His mother has implemented dietary modifications, including the avoidance of fatty meals and ensuring healthier food options instead of dining out. He reports his father has neuropathy in his feet and legs.    Prior Neurological Workup and History:  Complex partial seizures, with onset at age 4. He had a premature birth with extended NICU stay, with  "a diagnosis of cerebral palsy with continued left upper and left lower extremity weakness. He has been on Keppra 500 mg, twice a day, and has tolerated this very well without any side effects.  He has been seizure free since around age 13.   His family has wished to continue his seizure medications.   His seizures in the remote past have been precipitated with aura, followed by left eye gaze, Ramos's paralysis, urinary incontinence, left-sided jerking, and lasting 3 to 5 minutes per his mother.      He used to participate in special Olympics. He has a very supportive family. Now exercising and rowing at home.     Previously had multiple MRIs, CT scans of the head, and EEG in the past showing abnormal spikes consistent with focal seizures. Imaging per his mother looked \"okay overall\"-all per report. He does have the [employer] [facility] to help with services.    Drinks about 10 beers per week.    The following portions of the patient's history were reviewed and updated as appropriate: allergies, current medications, past family history, past medical history, past social history, past surgical history, and problem list.    Past Medical History:   Diagnosis Date    Cerebral palsy, monoplegia 1996    Developmental delay     Difficulty walking     HL (hearing loss)     Hyperlipidemia     Movement disorder     Neurological symptoms     Seizures        Past Surgical History:   Procedure Laterality Date    EYE SURGERY      Retina Reattached        Social History     Socioeconomic History    Marital status: Single   Tobacco Use    Smoking status: Never     Passive exposure: Never    Smokeless tobacco: Never   Vaping Use    Vaping status: Never Used   Substance and Sexual Activity    Alcohol use: Yes     Alcohol/week: 10.0 standard drinks of alcohol     Types: 10 Cans of beer per week    Drug use: Never    Sexual activity: Not Currently       Family History   Problem Relation Age of Onset    Parkinsonism Other         " "Grandmother    Diabetes Father     Hypertension Father     Parkinsonism Maternal Grandmother           Current Outpatient Medications:     B Complex Vitamins (vitamin b complex) capsule capsule, Take  by mouth Daily., Disp: , Rfl:     ezetimibe (ZETIA) 10 MG tablet, Take 1 tablet by mouth Daily., Disp: , Rfl:     levETIRAcetam (KEPPRA) 500 MG tablet, Take 1 tablet by mouth 2 (Two) Times a Day., Disp: 180 tablet, Rfl: 3    MILK THISTLE PO, Take  by mouth Daily., Disp: , Rfl:      Review of Systems   Neurological:  Negative for seizures.   All other systems reviewed and are negative.       Objective:  /80   Pulse 52   Ht 167.6 cm (66\")   Wt 68 kg (150 lb)   SpO2 98%   BMI 24.21 kg/m²     Neurologic Exam  Mental Status   Oriented to person, place, and time.   Speech: speech is normal   Level of consciousness: alert     Cranial Nerves   Cranial nerves II through XII intact.      Motor Exam   Muscle bulk: normal  Right arm tone: normal  Left arm tone: decreased  Right leg tone: normal  Left leg tone: decreased     Strength   Strength 5/5 except as noted.   LUE 4-5/5, LLE 4-5/5, SOME MILD FOOT DROP, MONOPLEGIA CHRONIC WITH CP      Gait, Coordination, and Reflexes      Gait  Gait: (mild antalgia d/t CP monoplegia)     Coordination   Finger to nose coordination: normal     Tremor   Resting tremor: absent  Intention tremor: absent  Action tremor: absent     Reflexes   Reflexes 2+ except as noted.   Right : 2+  Left : 2+     Physical Exam  Constitutional:       Appearance: Normal appearance.   Neurological:      Mental Status: He is alert and oriented to person, place, and time.      Coordination: Finger-Nose-Finger Test normal.   Psychiatric:         Mood and Affect: Mood and affect normal.         Speech: Speech normal.         Behavior: Behavior normal.         Thought Content: Thought content normal.         Cognition and Memory: Cognition and memory normal.         Judgment: Judgment normal. " "    Results:  Results  Laboratory Studies  4/2024 with PCP reviewed in EHR-AST and ALT were normal at 30 and 36. Cholesterol level was 230, triglycerides were slightly elevated at 219, HDL was 46, LDL was 144. Hemoglobin A1c was 5.4% in December 2023. CBC WNL.    Assessment/Plan:     Diagnoses and all orders for this visit:    1. Cerebral palsy, monoplegia (Primary)  Comments:  stable    2. Partial symptomatic epilepsy with complex partial seizures, not intractable, without status epilepticus  -     levETIRAcetam (KEPPRA) 500 MG tablet; Take 1 tablet by mouth 2 (Two) Times a Day.  Dispense: 180 tablet; Refill: 3           Assessment & Plan  Complex partial seizures.  The patient's medications have been refilled.  Continue close monitoring with PCP for liver enzymes and hyperlipidemia. Ideally LDL goal would be less than 100.    Follow-up  The patient is scheduled for a follow-up visit in 1 year.    Patient instructions include: No driving or operating heavy machinery, solo bathing or tub baths for 90 days from onset of most recent seizure, if they currently hold a license. Minimize stress as much as possible. Recommended 7-8 hours of sleep each night. Abstain from alcohol intake. Educated on Antiepileptic medications with possible side effects and signs and symptoms to report if prescribed during visit. Instructed to take seizure medication daily if prescribed. Reviewed potential seizure risk factors. Instructed to call 911 or our office if another seizure does occur.    Sudden Unexpected Death in Epilepsy is defined as \"the sudden, unexpected, witnessed or unwitnessed, non-traumatic, and non-drowning death in patients with epilepsy with or without evidence for a seizure, and excluding documented status epilepticus, in which postmortem examination does not reveal a structural or toxicological cause for death.\"-Epilepsy Foundation 2021. SUDEP is most commonly seen in Epilepsy patient's who's seizures are not well " controlled or who have seizures during their sleep. This condition is not fully understood. To reduce your risk of SUDEP, please take your seizure medications as prescribed, keep a diary of your seizures and when they occur and if your seizures are not well controlled, please notify us so we can collaborate with you to get your seizures better controlled. If you have additional questions, please visit: [email or web address] for more information.    Reviewed medications, potential side effects and signs and symptoms to report. Discussed risk versus benefits of treatment plan with patient and/or family-including medications, labs and radiology that may be ordered. Addressed questions and concerns during visit. Patient and/or family verbalized understanding and agree with plan.    During this visit the following were done:  Labs Reviewed [x]    Labs Ordered []    Radiology Reports Reviewed []    Radiology Ordered []    PCP Records Reviewed []    Referring Provider Records Reviewed []    ER Records Reviewed []    Hospital Records Reviewed []    History Obtained From Family [x]    Radiology Images Reviewed []    Other Reviewed []    Records Requested []      05/16/24   10:06 EDT    Patient or patient representative verbalized consent for the use of Ambient Listening during the visit with  KAT Lama for chart documentation. 5/16/2024  10:50 EDT    Note to patient: The 21st Century Cures Act makes medical notes like these available to patients in the interest of transparency. However, be advised this is a medical document. It is intended as peer to peer communication. It is written in medical language and may contain abbreviations or verbiage that are unfamiliar. It may appear blunt or direct. Medical documents are intended to carry relevant information, facts as evident, and the clinical opinion of the provider.

## 2024-05-16 NOTE — LETTER
May 16, 2024     Ramos Barajas MD  [address]  [address]  [facility] [address]    Patient: Luiz Sofia   YOB: 1995   Date of Visit: 5/16/2024       Dear Ramos Barajas MD    Luiz Sofia was in my office today. Below is a copy of my note.    If you have questions, please do not hesitate to call me. I look forward to following Luiz along with you.         Sincerely,        KAT Lama        CC: No Recipients    Subjective:     Patient ID: Luiz Sofia is a 28 y.o. male.    CC:   Chief Complaint   Patient presents with   • Seizures       HPI:   History of Present Illness  This is a 28-year-old male who presents for 1-year neurology follow-up and complex partial seizures present since age 4. He also has cerebral palsy. He has been seizure-free since age 13. He stays active with rowing at home, push ups and weight lifting. He used to be involved in the [employer], but since his dad has had some health changes, he is no longer participating. He has been taking Keppra 500 mg twice per day long-term for seizure prevention. He does drink a significant amount of alcohol. He has been following with his primary care provider for some elevated liver enzymes. He has steatosis of the liver. He does get labs with his primary care provider. He is here for follow-up and refills on medications. He is accompanied by his mother during today's visit.    The patient reports no alterations in his health status or seizure activity since his last consultation. His primary care physician conducted a comprehensive blood panel approximately 2 weeks ago, which indicated satisfactory liver function. Despite this, he continues to consume alcohol. His cholesterol levels were marginally elevated, with a follow-up appointment scheduled in 3 months to monitor his cholesterol levels. He has ceased participation in the [employer]. He maintains an active lifestyle, walking his dog daily, utilizing the  "rower, and performing push-ups and strength training at home. He denies any new concerns at this time. His mother has implemented dietary modifications, including the avoidance of fatty meals and ensuring healthier food options instead of dining out. He reports his father has neuropathy in his feet and legs.    Prior Neurological Workup and History:  Complex partial seizures, with onset at age 4. He had a premature birth with extended NICU stay, with a diagnosis of cerebral palsy with continued left upper and left lower extremity weakness. He has been on Keppra 500 mg, twice a day, and has tolerated this very well without any side effects.  He has been seizure free since around age 13.   His family has wished to continue his seizure medications.   His seizures in the remote past have been precipitated with aura, followed by left eye gaze, Ramos's paralysis, urinary incontinence, left-sided jerking, and lasting 3 to 5 minutes per his mother.      He used to participate in special Olympics. He has a very supportive family. Now exercising and rowing at home.     Previously had multiple MRIs, CT scans of the head, and EEG in the past showing abnormal spikes consistent with focal seizures. Imaging per his mother looked \"okay overall\"-all per report. He does have the [employer] [facility] to help with services.    Drinks about 10 beers per week.    The following portions of the patient's history were reviewed and updated as appropriate: allergies, current medications, past family history, past medical history, past social history, past surgical history, and problem list.    Past Medical History:   Diagnosis Date   • Cerebral palsy, monoplegia 1996   • Developmental delay    • Difficulty walking    • HL (hearing loss)    • Hyperlipidemia    • Movement disorder    • Neurological symptoms    • Seizures        Past Surgical History:   Procedure Laterality Date   • EYE SURGERY      Retina Reattached        Social History " "    Socioeconomic History   • Marital status: Single   Tobacco Use   • Smoking status: Never     Passive exposure: Never   • Smokeless tobacco: Never   Vaping Use   • Vaping status: Never Used   Substance and Sexual Activity   • Alcohol use: Yes     Alcohol/week: 10.0 standard drinks of alcohol     Types: 10 Cans of beer per week   • Drug use: Never   • Sexual activity: Not Currently       Family History   Problem Relation Age of Onset   • Parkinsonism Other         Grandmother   • Diabetes Father    • Hypertension Father    • Parkinsonism Maternal Grandmother           Current Outpatient Medications:   •  B Complex Vitamins (vitamin b complex) capsule capsule, Take  by mouth Daily., Disp: , Rfl:   •  ezetimibe (ZETIA) 10 MG tablet, Take 1 tablet by mouth Daily., Disp: , Rfl:   •  levETIRAcetam (KEPPRA) 500 MG tablet, Take 1 tablet by mouth 2 (Two) Times a Day., Disp: 180 tablet, Rfl: 3  •  MILK THISTLE PO, Take  by mouth Daily., Disp: , Rfl:      Review of Systems   Neurological:  Negative for seizures.   All other systems reviewed and are negative.       Objective:  /80   Pulse 52   Ht 167.6 cm (66\")   Wt 68 kg (150 lb)   SpO2 98%   BMI 24.21 kg/m²     Neurologic Exam  Mental Status   Oriented to person, place, and time.   Speech: speech is normal   Level of consciousness: alert     Cranial Nerves   Cranial nerves II through XII intact.      Motor Exam   Muscle bulk: normal  Right arm tone: normal  Left arm tone: decreased  Right leg tone: normal  Left leg tone: decreased     Strength   Strength 5/5 except as noted.   LUE 4-5/5, LLE 4-5/5, SOME MILD FOOT DROP, MONOPLEGIA CHRONIC WITH CP      Gait, Coordination, and Reflexes      Gait  Gait: (mild antalgia d/t CP monoplegia)     Coordination   Finger to nose coordination: normal     Tremor   Resting tremor: absent  Intention tremor: absent  Action tremor: absent     Reflexes   Reflexes 2+ except as noted.   Right : 2+  Left : 2+     Physical " Exam  Constitutional:       Appearance: Normal appearance.   Neurological:      Mental Status: He is alert and oriented to person, place, and time.      Coordination: Finger-Nose-Finger Test normal.   Psychiatric:         Mood and Affect: Mood and affect normal.         Speech: Speech normal.         Behavior: Behavior normal.         Thought Content: Thought content normal.         Cognition and Memory: Cognition and memory normal.         Judgment: Judgment normal.     Results:  Results  Laboratory Studies  4/2024 with PCP reviewed in EHR-AST and ALT were normal at 30 and 36. Cholesterol level was 230, triglycerides were slightly elevated at 219, HDL was 46, LDL was 144. Hemoglobin A1c was 5.4% in December 2023. CBC WNL.    Assessment/Plan:     Diagnoses and all orders for this visit:    1. Cerebral palsy, monoplegia (Primary)  Comments:  stable    2. Partial symptomatic epilepsy with complex partial seizures, not intractable, without status epilepticus  -     levETIRAcetam (KEPPRA) 500 MG tablet; Take 1 tablet by mouth 2 (Two) Times a Day.  Dispense: 180 tablet; Refill: 3           Assessment & Plan  Complex partial seizures.  The patient's medications have been refilled.  Continue close monitoring with PCP for liver enzymes and hyperlipidemia. Ideally LDL goal would be less than 100.    Follow-up  The patient is scheduled for a follow-up visit in 1 year.    Patient instructions include: No driving or operating heavy machinery, solo bathing or tub baths for 90 days from onset of most recent seizure, if they currently hold a license. Minimize stress as much as possible. Recommended 7-8 hours of sleep each night. Abstain from alcohol intake. Educated on Antiepileptic medications with possible side effects and signs and symptoms to report if prescribed during visit. Instructed to take seizure medication daily if prescribed. Reviewed potential seizure risk factors. Instructed to call 911 or our office if another  "seizure does occur.    Sudden Unexpected Death in Epilepsy is defined as \"the sudden, unexpected, witnessed or unwitnessed, non-traumatic, and non-drowning death in patients with epilepsy with or without evidence for a seizure, and excluding documented status epilepticus, in which postmortem examination does not reveal a structural or toxicological cause for death.\"-Epilepsy Foundation 2021. SUDEP is most commonly seen in Epilepsy patient's who's seizures are not well controlled or who have seizures during their sleep. This condition is not fully understood. To reduce your risk of SUDEP, please take your seizure medications as prescribed, keep a diary of your seizures and when they occur and if your seizures are not well controlled, please notify us so we can collaborate with you to get your seizures better controlled. If you have additional questions, please visit: [email or web address] for more information.    Reviewed medications, potential side effects and signs and symptoms to report. Discussed risk versus benefits of treatment plan with patient and/or family-including medications, labs and radiology that may be ordered. Addressed questions and concerns during visit. Patient and/or family verbalized understanding and agree with plan.    During this visit the following were done:  Labs Reviewed [x]    Labs Ordered []    Radiology Reports Reviewed []    Radiology Ordered []    PCP Records Reviewed []    Referring Provider Records Reviewed []    ER Records Reviewed []    Hospital Records Reviewed []    History Obtained From Family [x]    Radiology Images Reviewed []    Other Reviewed []    Records Requested []      05/16/24   10:06 EDT    Patient or patient representative verbalized consent for the use of Ambient Listening during the visit with  KAT Lama for chart documentation. 5/16/2024  10:50 EDT    Note to patient: The 21st Century Cures Act makes medical notes like these available to patients in " the interest of transparency. However, be advised this is a medical document. It is intended as peer to peer communication. It is written in medical language and may contain abbreviations or verbiage that are unfamiliar. It may appear blunt or direct. Medical documents are intended to carry relevant information, facts as evident, and the clinical opinion of the provider.

## 2024-05-17 ENCOUNTER — PATIENT ROUNDING (BHMG ONLY) (OUTPATIENT)
Dept: NEUROLOGY | Facility: CLINIC | Age: 29
End: 2024-05-17
Payer: MEDICAID

## 2024-05-17 NOTE — PROGRESS NOTES
A My-Chart message has been sent to the patient for PATIENT ROUNDING with [facility]